# Patient Record
Sex: MALE | Race: OTHER | HISPANIC OR LATINO | Employment: PART TIME | URBAN - METROPOLITAN AREA
[De-identification: names, ages, dates, MRNs, and addresses within clinical notes are randomized per-mention and may not be internally consistent; named-entity substitution may affect disease eponyms.]

---

## 2019-08-03 ENCOUNTER — APPOINTMENT (EMERGENCY)
Dept: RADIOLOGY | Facility: HOSPITAL | Age: 39
End: 2019-08-03
Payer: COMMERCIAL

## 2019-08-03 ENCOUNTER — HOSPITAL ENCOUNTER (EMERGENCY)
Facility: HOSPITAL | Age: 39
Discharge: HOME | End: 2019-08-03
Attending: EMERGENCY MEDICINE
Payer: COMMERCIAL

## 2019-08-03 VITALS
SYSTOLIC BLOOD PRESSURE: 113 MMHG | OXYGEN SATURATION: 90 % | HEART RATE: 106 BPM | DIASTOLIC BLOOD PRESSURE: 68 MMHG | TEMPERATURE: 96.8 F | RESPIRATION RATE: 16 BRPM

## 2019-08-03 DIAGNOSIS — T40.1X1A HEROIN OVERDOSE (HCC): ICD-10-CM

## 2019-08-03 DIAGNOSIS — J18.9 PNEUMONIA: Primary | ICD-10-CM

## 2019-08-03 PROCEDURE — 71045 X-RAY EXAM CHEST 1 VIEW: CPT

## 2019-08-03 PROCEDURE — 99285 EMERGENCY DEPT VISIT HI MDM: CPT

## 2019-08-03 PROCEDURE — 99283 EMERGENCY DEPT VISIT LOW MDM: CPT | Performed by: EMERGENCY MEDICINE

## 2019-08-03 RX ORDER — DOXYCYCLINE HYCLATE 100 MG/1
100 CAPSULE ORAL 2 TIMES DAILY
Qty: 14 CAPSULE | Refills: 0 | Status: SHIPPED | OUTPATIENT
Start: 2019-08-03 | End: 2019-08-10

## 2019-08-03 RX ORDER — NALOXONE HYDROCHLORIDE 1 MG/ML
INJECTION INTRAMUSCULAR; INTRAVENOUS; SUBCUTANEOUS
Status: COMPLETED
Start: 2019-08-03 | End: 2019-08-03

## 2020-03-03 ENCOUNTER — HOSPITAL ENCOUNTER (EMERGENCY)
Facility: HOSPITAL | Age: 40
Discharge: HOME | End: 2020-03-03
Attending: EMERGENCY MEDICINE | Admitting: EMERGENCY MEDICINE
Payer: COMMERCIAL

## 2020-03-03 VITALS
RESPIRATION RATE: 18 BRPM | TEMPERATURE: 98.9 F | SYSTOLIC BLOOD PRESSURE: 139 MMHG | BODY MASS INDEX: 35.66 KG/M2 | HEART RATE: 92 BPM | OXYGEN SATURATION: 100 % | DIASTOLIC BLOOD PRESSURE: 65 MMHG | WEIGHT: 277.78 LBS

## 2020-03-03 DIAGNOSIS — M54.50 LOW BACK PAIN: Primary | ICD-10-CM

## 2020-03-03 PROCEDURE — 96372 THER/PROPH/DIAG INJ SC/IM: CPT

## 2020-03-03 PROCEDURE — 99284 EMERGENCY DEPT VISIT MOD MDM: CPT | Performed by: PHYSICIAN ASSISTANT

## 2020-03-03 PROCEDURE — 99283 EMERGENCY DEPT VISIT LOW MDM: CPT

## 2020-03-03 RX ORDER — NAPROXEN 500 MG/1
500 TABLET ORAL 2 TIMES DAILY PRN
Qty: 30 TABLET | Refills: 0 | Status: SHIPPED | OUTPATIENT
Start: 2020-03-03 | End: 2021-02-13

## 2020-03-03 RX ORDER — LIDOCAINE 50 MG/G
1 PATCH TOPICAL ONCE
Status: DISCONTINUED | OUTPATIENT
Start: 2020-03-03 | End: 2020-03-03 | Stop reason: HOSPADM

## 2020-03-03 RX ORDER — DIAZEPAM 5 MG/1
5 TABLET ORAL ONCE
Status: COMPLETED | OUTPATIENT
Start: 2020-03-03 | End: 2020-03-03

## 2020-03-03 RX ORDER — DIAZEPAM 5 MG/1
5 TABLET ORAL 2 TIMES DAILY PRN
Qty: 10 TABLET | Refills: 0 | Status: SHIPPED | OUTPATIENT
Start: 2020-03-03 | End: 2021-02-13

## 2020-03-03 RX ORDER — KETOROLAC TROMETHAMINE 30 MG/ML
15 INJECTION, SOLUTION INTRAMUSCULAR; INTRAVENOUS ONCE
Status: COMPLETED | OUTPATIENT
Start: 2020-03-03 | End: 2020-03-03

## 2020-03-03 RX ADMIN — DIAZEPAM 5 MG: 5 TABLET ORAL at 20:03

## 2020-03-03 RX ADMIN — KETOROLAC TROMETHAMINE 15 MG: 30 INJECTION, SOLUTION INTRAMUSCULAR at 20:04

## 2020-03-03 RX ADMIN — LIDOCAINE 1 PATCH: 50 PATCH TOPICAL at 20:05

## 2020-03-04 NOTE — DISCHARGE INSTRUCTIONS
Please refer to the attached information for strict return instructions  If symptoms worsen or new symptoms develop please return to the ER   Please follow up with spine program

## 2020-03-04 NOTE — ED PROVIDER NOTES
History  Chief Complaint   Patient presents with    Back Pain     Patient reports sudden lower back pain when stepping out of his truck today  Reports injury to back years ago  Shama Fnug is a 45 yo M presenting with R sided low back pain which began after he "stepped off" his truck step this morning  States pain occasionally radiates down posterior R upper leg  Denies numbness, tingling, or weakness in lower extremities  No loss of control of bowel or bladder function  No saddle anesthesia  No fevers/chills/IVDA  Denies dysuria, urinary urgency, frequency, gross hematuria  Does describe prior history of similar back pain  History provided by:  Patient   used: No    Back Pain   Location:  Lumbar spine  Quality:  Aching and cramping  Radiates to:  R posterior upper leg  Duration:  12 hours  Timing:  Constant  Progression:  Waxing and waning  Chronicity:  New  Context: not falling and not lifting heavy objects    Relieved by:  None tried  Worsened by:  Touching, twisting and bending  Ineffective treatments:  None tried  Associated symptoms: leg pain    Associated symptoms: no abdominal pain, no bladder incontinence, no bowel incontinence, no chest pain, no dysuria, no fever, no headaches, no numbness, no paresthesias, no perianal numbness and no weakness    Risk factors: no recent surgery and no steroid use        None       Past Medical History:   Diagnosis Date    Asthma        Past Surgical History:   Procedure Laterality Date    NO PAST SURGERIES         History reviewed  No pertinent family history  I have reviewed and agree with the history as documented      E-Cigarette/Vaping    E-Cigarette Use Never User      E-Cigarette/Vaping Substances     Social History     Tobacco Use    Smoking status: Current Every Day Smoker     Types: Cigarettes    Smokeless tobacco: Never Used   Substance Use Topics    Alcohol use: Not Currently    Drug use: Not on file       Review of Systems Constitutional: Negative for activity change, chills and fever  HENT: Negative for congestion, rhinorrhea and sore throat  Eyes: Negative for photophobia and visual disturbance  Respiratory: Negative for cough, chest tightness, shortness of breath and wheezing  Cardiovascular: Negative for chest pain and palpitations  Gastrointestinal: Negative for abdominal pain, bowel incontinence, constipation, diarrhea, nausea and vomiting  Genitourinary: Negative for bladder incontinence, difficulty urinating, dysuria, enuresis, flank pain, frequency, hematuria and urgency  Musculoskeletal: Positive for back pain  Negative for gait problem, neck pain and neck stiffness  Skin: Negative for rash and wound  Neurological: Negative for dizziness, tremors, speech difficulty, weakness, light-headedness, numbness, headaches and paresthesias  No loss of bowel/bladder control       Physical Exam  Physical Exam   Constitutional: He is oriented to person, place, and time  He appears well-developed and well-nourished  No distress  HENT:   Head: Normocephalic and atraumatic  Right Ear: External ear normal    Left Ear: External ear normal    Nose: Nose normal    Mouth/Throat: Oropharynx is clear and moist  No oropharyngeal exudate  Eyes: Pupils are equal, round, and reactive to light  Conjunctivae and EOM are normal    Neck: Normal range of motion  Neck supple  Cardiovascular: Normal rate, regular rhythm, normal heart sounds and intact distal pulses  Exam reveals no gallop and no friction rub  No murmur heard  Pulmonary/Chest: Effort normal and breath sounds normal  No respiratory distress  He has no wheezes  He has no rales  Abdominal: Soft  He exhibits no distension and no mass  There is no tenderness  There is no guarding  Musculoskeletal: He exhibits tenderness  He exhibits no edema or deformity  TTP to R lumbar paraspinal musculature  Limited ROM low back in flexion/extension due to pain   5/5 strength to b/l LE  Intact sensation b/l LE  Ambulatory without significant difficulty  Lymphadenopathy:     He has no cervical adenopathy  Neurological: He is alert and oriented to person, place, and time  He displays normal reflexes  No cranial nerve deficit or sensory deficit  He exhibits normal muscle tone  Coordination normal    Skin: Skin is warm and dry  Capillary refill takes less than 2 seconds  No rash noted  He is not diaphoretic  No erythema  No pallor  Psychiatric: He has a normal mood and affect  His behavior is normal  Judgment and thought content normal    Nursing note and vitals reviewed  Vital Signs  ED Triage Vitals [03/03/20 1839]   Temperature Pulse Respirations Blood Pressure SpO2   98 9 °F (37 2 °C) 92 18 139/65 100 %      Temp Source Heart Rate Source Patient Position - Orthostatic VS BP Location FiO2 (%)   Temporal Monitor Sitting Right arm --      Pain Score       Worst Possible Pain           Vitals:    03/03/20 1839   BP: 139/65   Pulse: 92   Patient Position - Orthostatic VS: Sitting         Visual Acuity      ED Medications  Medications   ketorolac (TORADOL) injection 15 mg (15 mg Intramuscular Given 3/3/20 2004)   diazepam (VALIUM) tablet 5 mg (5 mg Oral Given 3/3/20 2003)       Diagnostic Studies  Results Reviewed     None                 No orders to display              Procedures  Procedures         ED Course                               MDM  Number of Diagnoses or Management Options  Low back pain:   Diagnosis management comments: R low back pain after stepping off truck this morning  Radiation down posterior R upper leg  Prior history of similar  No urinary symptoms  No lower extremity neuro symptoms  LE neuro intact  Will provide valium, toradol  Follow up with spine program advised  Strict return indications discussed       Patient Progress  Patient progress: stable        Disposition  Final diagnoses:   Low back pain     Time reflects when diagnosis was documented in both MDM as applicable and the Disposition within this note     Time User Action Codes Description Comment    3/3/2020  8:48 PM Haven Height Add [M54 5] Low back pain       ED Disposition     ED Disposition Condition Date/Time Comment    Discharge Stable Tue Mar 3, 2020  8:47 PM Anabell Yanez discharge to home/self care  Follow-up Information     Follow up With Specialties Details Why Contact Info Additional Information    [facility] Physical Therapy Schedule an appointment as soon as possible for a visit   [phone]  Emergency Department Emergency Medicine  If symptoms worsen [facility] [id number]  [place] [address] ED, [address] [facility] , [place], [place], [address]          Discharge Medication List as of 3/3/2020  8:51 PM      START taking these medications    Details   diazepam (VALIUM) 5 mg tablet Take 1 tablet (5 mg total) by mouth 2 (two) times a day as needed for muscle spasms for up to 10 doses, Starting Tue 3/3/2020, Normal      naproxen (NAPROSYN) 500 mg tablet Take 1 tablet (500 mg total) by mouth 2 (two) times a day as needed for mild pain or moderate pain, Starting Tue 3/3/2020, Normal           No discharge procedures on file      PDMP Review     None          ED Provider  Electronically Signed by           Mary Beth Martinez PA-C  03/04/20 0675

## 2020-03-06 ENCOUNTER — NURSE TRIAGE (OUTPATIENT)
Dept: PHYSICAL THERAPY | Facility: OTHER | Age: 40
End: 2020-03-06

## 2020-03-06 DIAGNOSIS — M54.50 ACUTE RIGHT-SIDED LOW BACK PAIN, UNSPECIFIED WHETHER SCIATICA PRESENT: Primary | ICD-10-CM

## 2020-03-06 NOTE — TELEPHONE ENCOUNTER
Additional Information   Negative: Is this related to a work injury?  Negative: Is this related to an MVA?  Negative: Are you currently recieving homecare services?  Negative: Has the patient had unexplained weight loss?  Negative: Does the patient have a fever?  Negative: Is the patient experiencing urine retention?  Negative: Is the patient experiencing blood in sputum?  Negative: Has the patient experienced major trauma? (fall from height, high speed collision, direct blow to spine) and is also experiencing nausea, light-headedness, or loss of consciousness?  Negative: Is the patient experiencing acute drop foot or paralysis?  Negative: Is this a chronic condition? Background - Initial Assessment  Clinical complaint: right sided mid to low back pain  Radiates down to right  Leg and foot  Date of onset: 3/3/20 when getting out of truck-felt sudden pain in back   Frequency of pain: constant  Quality of pain: stabbing "shock" like    Protocols used: [facility] COMPREHENSIVE SPINE PROGRAM PROTOCOL    Please ED Note from 3/3/20    Patient went to ED 3/3/20 for above complaints  Symptoms have not resolved and his pain is radiating down to right foot on occasion  "it feels like shocks or spasms"  Able to ambulate and is on light duty at work as a   Patient currently at work, but willing to do triage with nurse at this time  Referral entered and sent to preferred [facility] site  Patient could not take contact information down at end of call  Nurse instructed him to look up on line and to reach out to that facility if he does not hear from them by Monday morning  Agreed and very appreciative of call/triage  Patient made aware that he will be receiving a call from the behavioral office as well d/t score  Agreed and understood      Self referral

## 2020-03-09 ENCOUNTER — TRANSCRIBE ORDERS (OUTPATIENT)
Dept: PHYSICAL THERAPY | Facility: CLINIC | Age: 40
End: 2020-03-09

## 2020-03-09 ENCOUNTER — EVALUATION (OUTPATIENT)
Dept: PHYSICAL THERAPY | Facility: CLINIC | Age: 40
End: 2020-03-09
Payer: COMMERCIAL

## 2020-03-09 VITALS — SYSTOLIC BLOOD PRESSURE: 132 MMHG | TEMPERATURE: 98.6 F | HEART RATE: 71 BPM | DIASTOLIC BLOOD PRESSURE: 94 MMHG

## 2020-03-09 DIAGNOSIS — M54.50 ACUTE RIGHT-SIDED LOW BACK PAIN, UNSPECIFIED WHETHER SCIATICA PRESENT: Primary | ICD-10-CM

## 2020-03-09 PROCEDURE — 97161 PT EVAL LOW COMPLEX 20 MIN: CPT | Performed by: PHYSICAL THERAPIST
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
## 2020-03-09 NOTE — LETTER
March 9, 2020      Patient: Lanie Barcenas  YOB: 1980   Date of Visit: 3/9/2020      To Whom it May Concern:    Lanie Barcenas has been seen by the Shaun Segovia and is now under my care  Lanie Barcenas may return to work on 3/9/2020 with no heavy lifting (>20 pounds)  Please contact me if you should have any questions       Sincerely,    Ting Haney, PT

## 2020-03-09 NOTE — LETTER
March 9, 2020      Patient: Gonsalo Church  YOB: 1980   Date of Visit: 3/9/2020      To Whom it May Concern:    Gonsalo Church has been seen by the Shaun Segovia and is now under my care  Gonsalo Church may return to work on 3/9/2020 with no heavy lifting (>20 pounds)  Please contact me if you should have any questions       Sincerely,    Delma Zapata PT

## 2020-03-12 ENCOUNTER — OFFICE VISIT (OUTPATIENT)
Dept: PHYSICAL THERAPY | Facility: CLINIC | Age: 40
End: 2020-03-12
Payer: COMMERCIAL

## 2020-03-12 DIAGNOSIS — M54.50 ACUTE RIGHT-SIDED LOW BACK PAIN, UNSPECIFIED WHETHER SCIATICA PRESENT: Primary | ICD-10-CM

## 2020-03-12 PROCEDURE — 97112 NEUROMUSCULAR REEDUCATION: CPT

## 2020-03-12 PROCEDURE — 97110 THERAPEUTIC EXERCISES: CPT

## 2020-03-12 NOTE — PROGRESS NOTES
Patient insistent on returning to work tomorrow, as he states he is in financial dire straits  Patient states "If you don't give me a note, I'm going to be homeless " He was insistent and pressured PTA and PT regarding the issue, stating "I am not leaving here without a note " He reports he will be returning to light duty  Note was written for patient today to return to work, however, I advised him that he should avoid heavy lifting to avoid reaggravating symptoms  Patient state he will still be returning for follow up therapy appointments

## 2020-03-12 NOTE — PROGRESS NOTES
Daily Note     Today's date: 3/12/2020  Patient name: Lanie Barcenas  : 1980  MRN: [id number]  Referring provider: Bruno Moreira, PT  Dx:   Encounter Diagnosis     ICD-10-CM    1  Acute right-sided low back pain, unspecified whether sciatica present M54 5                   Subjective: Pt states he feels much better since his last session  Pt denied having any pain at this time  Pt states he would like to get another note for work stating that he has no limitation as he needs to get back to work  Objective: See treatment diary below      Assessment:  Supervising PT was notified of pt request about his note for work  Supervising PT addressed this with the pts  Pt tolerated treatment as expected with no reported pain throughout session  Pt required VC throughout session for proper TA contraction  Patient would benefit from continued PT      Plan: Continue per plan of care  Precautions: n/a    Date 3/9            FOTO IE            Re-eval IE              Daily Treatment Diary     Manual  3/9 3/12           Lumbar sidelying rotational mobs KELLY Gr IV bilat           Lumbar LF/Rot  mobs                            Exercise Diary  3/9 3/12          bike  5 min           Lumbar pball roll outs  5" x 10           Seated hamstring str  3 x 30"           Seated QL stretch  30" x 3                       LTR 30"x3 e 30" x 3 ea          SKTC 30"x3 e 30" x 3 ea           90/90 nn glides 30x3 e 30 x 3 ea           Sidelying UTR  5" x 10           H/L TA   5" x 10                       3-way Child's pose                        Progress to:            SLR w/ TA            H/L TA alt UE/LE            Quad   Alt UE/LE                  Modalities  3/9

## 2020-03-17 ENCOUNTER — APPOINTMENT (OUTPATIENT)
Dept: PHYSICAL THERAPY | Facility: CLINIC | Age: 40
End: 2020-03-17
Payer: COMMERCIAL

## 2020-03-26 ENCOUNTER — APPOINTMENT (OUTPATIENT)
Dept: PHYSICAL THERAPY | Facility: CLINIC | Age: 40
End: 2020-03-26
Payer: COMMERCIAL

## 2020-03-31 ENCOUNTER — APPOINTMENT (OUTPATIENT)
Dept: PHYSICAL THERAPY | Facility: CLINIC | Age: 40
End: 2020-03-31
Payer: COMMERCIAL

## 2020-06-14 ENCOUNTER — HOSPITAL ENCOUNTER (EMERGENCY)
Facility: HOSPITAL | Age: 40
Discharge: HOME | End: 2020-06-14
Attending: EMERGENCY MEDICINE | Admitting: EMERGENCY MEDICINE
Payer: COMMERCIAL

## 2020-06-14 VITALS
HEIGHT: 74 IN | BODY MASS INDEX: 34.65 KG/M2 | TEMPERATURE: 98.4 F | WEIGHT: 270 LBS | RESPIRATION RATE: 16 BRPM | HEART RATE: 71 BPM | SYSTOLIC BLOOD PRESSURE: 122 MMHG | OXYGEN SATURATION: 98 % | DIASTOLIC BLOOD PRESSURE: 67 MMHG

## 2020-06-14 DIAGNOSIS — G89.29 ACUTE EXACERBATION OF CHRONIC LOW BACK PAIN: Primary | ICD-10-CM

## 2020-06-14 DIAGNOSIS — M54.50 ACUTE EXACERBATION OF CHRONIC LOW BACK PAIN: Primary | ICD-10-CM

## 2020-06-14 PROCEDURE — 99283 EMERGENCY DEPT VISIT LOW MDM: CPT

## 2020-06-14 PROCEDURE — 96372 THER/PROPH/DIAG INJ SC/IM: CPT

## 2020-06-14 PROCEDURE — 99284 EMERGENCY DEPT VISIT MOD MDM: CPT | Performed by: PHYSICIAN ASSISTANT

## 2020-06-14 RX ORDER — LIDOCAINE 50 MG/G
1 PATCH TOPICAL ONCE
Status: DISCONTINUED | OUTPATIENT
Start: 2020-06-14 | End: 2020-06-14 | Stop reason: HOSPADM

## 2020-06-14 RX ORDER — NAPROXEN 500 MG/1
500 TABLET ORAL 2 TIMES DAILY WITH MEALS
Qty: 12 TABLET | Refills: 0 | Status: SHIPPED | OUTPATIENT
Start: 2020-06-14 | End: 2021-02-13

## 2020-06-14 RX ORDER — CYCLOBENZAPRINE HCL 10 MG
10 TABLET ORAL 2 TIMES DAILY PRN
Qty: 14 TABLET | Refills: 0 | Status: SHIPPED | OUTPATIENT
Start: 2020-06-14 | End: 2021-02-13

## 2020-06-14 RX ORDER — CYCLOBENZAPRINE HCL 10 MG
10 TABLET ORAL ONCE
Status: COMPLETED | OUTPATIENT
Start: 2020-06-14 | End: 2020-06-14

## 2020-06-14 RX ORDER — KETOROLAC TROMETHAMINE 30 MG/ML
15 INJECTION, SOLUTION INTRAMUSCULAR; INTRAVENOUS ONCE
Status: COMPLETED | OUTPATIENT
Start: 2020-06-14 | End: 2020-06-14

## 2020-06-14 RX ADMIN — CYCLOBENZAPRINE HYDROCHLORIDE 10 MG: 10 TABLET, FILM COATED ORAL at 11:27

## 2020-06-14 RX ADMIN — KETOROLAC TROMETHAMINE 15 MG: 30 INJECTION, SOLUTION INTRAMUSCULAR at 11:26

## 2020-06-14 RX ADMIN — LIDOCAINE 1 PATCH: 50 PATCH TOPICAL at 11:27

## 2020-06-15 ENCOUNTER — NURSE TRIAGE (OUTPATIENT)
Dept: PHYSICAL THERAPY | Facility: OTHER | Age: 40
End: 2020-06-15

## 2020-06-15 DIAGNOSIS — G89.29 ACUTE EXACERBATION OF CHRONIC LOW BACK PAIN: Primary | ICD-10-CM

## 2020-06-15 DIAGNOSIS — M54.50 ACUTE EXACERBATION OF CHRONIC LOW BACK PAIN: Primary | ICD-10-CM

## 2020-06-16 ENCOUNTER — AMB VIDEO VISIT (OUTPATIENT)
Dept: URGENT CARE | Facility: CLINIC | Age: 40
End: 2020-06-16

## 2020-06-16 ENCOUNTER — EVALUATION (OUTPATIENT)
Dept: PHYSICAL THERAPY | Facility: CLINIC | Age: 40
End: 2020-06-16
Payer: COMMERCIAL

## 2020-06-16 DIAGNOSIS — G89.29 ACUTE EXACERBATION OF CHRONIC LOW BACK PAIN: ICD-10-CM

## 2020-06-16 DIAGNOSIS — M62.830 SPASM OF MUSCLE OF LOWER BACK: Primary | ICD-10-CM

## 2020-06-16 DIAGNOSIS — M54.50 ACUTE EXACERBATION OF CHRONIC LOW BACK PAIN: ICD-10-CM

## 2020-06-16 PROCEDURE — 97162 PT EVAL MOD COMPLEX 30 MIN: CPT | Performed by: PHYSICAL THERAPIST

## 2020-06-16 RX ORDER — PREDNISONE 50 MG/1
50 TABLET ORAL DAILY
Qty: 5 TABLET | Refills: 0 | Status: SHIPPED | OUTPATIENT
Start: 2020-06-16 | End: 2020-06-21

## 2020-06-16 RX ORDER — METHOCARBAMOL 750 MG/1
750 TABLET, FILM COATED ORAL EVERY 6 HOURS PRN
Qty: 12 TABLET | Refills: 0 | Status: SHIPPED | OUTPATIENT
Start: 2020-06-16 | End: 2021-02-13

## 2020-06-18 ENCOUNTER — OFFICE VISIT (OUTPATIENT)
Dept: PHYSICAL THERAPY | Facility: CLINIC | Age: 40
End: 2020-06-18
Payer: COMMERCIAL

## 2020-06-18 DIAGNOSIS — M54.50 ACUTE EXACERBATION OF CHRONIC LOW BACK PAIN: Primary | ICD-10-CM

## 2020-06-18 DIAGNOSIS — G89.29 ACUTE EXACERBATION OF CHRONIC LOW BACK PAIN: Primary | ICD-10-CM

## 2020-06-18 PROCEDURE — 97110 THERAPEUTIC EXERCISES: CPT | Performed by: PHYSICAL THERAPIST

## 2020-06-18 PROCEDURE — 97014 ELECTRIC STIMULATION THERAPY: CPT | Performed by: PHYSICAL THERAPIST

## 2020-06-18 PROCEDURE — 97010 HOT OR COLD PACKS THERAPY: CPT | Performed by: PHYSICAL THERAPIST

## 2020-06-18 PROCEDURE — 97530 THERAPEUTIC ACTIVITIES: CPT | Performed by: PHYSICAL THERAPIST

## 2020-06-18 PROCEDURE — 97112 NEUROMUSCULAR REEDUCATION: CPT | Performed by: PHYSICAL THERAPIST

## 2020-06-23 ENCOUNTER — OFFICE VISIT (OUTPATIENT)
Dept: PHYSICAL THERAPY | Facility: CLINIC | Age: 40
End: 2020-06-23
Payer: COMMERCIAL

## 2020-06-23 DIAGNOSIS — G89.29 ACUTE EXACERBATION OF CHRONIC LOW BACK PAIN: Primary | ICD-10-CM

## 2020-06-23 DIAGNOSIS — M54.50 ACUTE EXACERBATION OF CHRONIC LOW BACK PAIN: Primary | ICD-10-CM

## 2020-06-23 PROCEDURE — 97014 ELECTRIC STIMULATION THERAPY: CPT | Performed by: PHYSICAL THERAPIST

## 2020-06-23 PROCEDURE — 97010 HOT OR COLD PACKS THERAPY: CPT | Performed by: PHYSICAL THERAPIST

## 2020-06-23 PROCEDURE — 97112 NEUROMUSCULAR REEDUCATION: CPT | Performed by: PHYSICAL THERAPIST

## 2020-06-23 PROCEDURE — 97110 THERAPEUTIC EXERCISES: CPT | Performed by: PHYSICAL THERAPIST

## 2020-06-24 ENCOUNTER — TRANSCRIBE ORDERS (OUTPATIENT)
Dept: ADMINISTRATIVE | Facility: HOSPITAL | Age: 40
End: 2020-06-24

## 2020-06-24 ENCOUNTER — CONSULT (OUTPATIENT)
Dept: NEUROSURGERY | Facility: CLINIC | Age: 40
End: 2020-06-24
Payer: COMMERCIAL

## 2020-06-24 ENCOUNTER — HOSPITAL ENCOUNTER (OUTPATIENT)
Dept: RADIOLOGY | Facility: HOSPITAL | Age: 40
Discharge: HOME | End: 2020-06-24

## 2020-06-24 VITALS
SYSTOLIC BLOOD PRESSURE: 129 MMHG | DIASTOLIC BLOOD PRESSURE: 84 MMHG | WEIGHT: 283 LBS | BODY MASS INDEX: 36.32 KG/M2 | RESPIRATION RATE: 16 BRPM | TEMPERATURE: 98 F | HEART RATE: 82 BPM | HEIGHT: 74 IN

## 2020-06-24 DIAGNOSIS — M54.50 ACUTE EXACERBATION OF CHRONIC LOW BACK PAIN: ICD-10-CM

## 2020-06-24 DIAGNOSIS — N50.819 TESTICULAR PAIN: Primary | ICD-10-CM

## 2020-06-24 DIAGNOSIS — G89.29 ACUTE EXACERBATION OF CHRONIC LOW BACK PAIN: ICD-10-CM

## 2020-06-24 DIAGNOSIS — N50.819 TESTICULAR PAIN: ICD-10-CM

## 2020-06-24 PROCEDURE — 99204 OFFICE O/P NEW MOD 45 MIN: CPT | Performed by: PHYSICIAN ASSISTANT

## 2020-06-25 ENCOUNTER — OFFICE VISIT (OUTPATIENT)
Dept: PHYSICAL THERAPY | Facility: CLINIC | Age: 40
End: 2020-06-25
Payer: COMMERCIAL

## 2020-06-25 DIAGNOSIS — G89.29 ACUTE EXACERBATION OF CHRONIC LOW BACK PAIN: Primary | ICD-10-CM

## 2020-06-25 DIAGNOSIS — M54.50 ACUTE EXACERBATION OF CHRONIC LOW BACK PAIN: Primary | ICD-10-CM

## 2020-06-25 PROCEDURE — 97140 MANUAL THERAPY 1/> REGIONS: CPT | Performed by: PHYSICAL THERAPIST

## 2020-06-25 PROCEDURE — 97110 THERAPEUTIC EXERCISES: CPT | Performed by: PHYSICAL THERAPIST

## 2020-06-25 PROCEDURE — 97014 ELECTRIC STIMULATION THERAPY: CPT | Performed by: PHYSICAL THERAPIST

## 2020-06-25 PROCEDURE — 97530 THERAPEUTIC ACTIVITIES: CPT | Performed by: PHYSICAL THERAPIST

## 2020-06-25 PROCEDURE — 97010 HOT OR COLD PACKS THERAPY: CPT | Performed by: PHYSICAL THERAPIST

## 2020-07-01 ENCOUNTER — TELEPHONE (OUTPATIENT)
Dept: PSYCHIATRY | Facility: CLINIC | Age: 40
End: 2020-07-01

## 2020-07-01 NOTE — TELEPHONE ENCOUNTER
Behavorial Health Outpatient Intake Questions    Referred by: [facility]     Please advised interviewee that they need to answer all questions truthfully to allow for best care and any misrepresentations of information may affect their ability to be seen at this clinic   => Was this discussed? Yes     Behavorial Health Outpatient Intake History -     Presenting Problem (in patient's words): His current back pain is causing him some Anxiety and Depression, can't move around like he use to  Patient wants to try therapy  Are there any developmental disabilities? ? If yes, can they speak to you on the phone? If they are too limited to speak to you on phone, refer out No    Are you taking any psychiatric medications? No    => If yes, who prescribes? If yes, are they injectable medications? Does the patient have a language barrier or hearing impairment? No    Have you been treated at [facility] by a therapist or a doctor in the past? If yes, who? No    Has the patient been hospitalized for mental health? No   If yes, how long ago was last hospitalization and where was it? Do you actively use alcohol or marijuana or illegal substances? If yes, what and how much - refer out to Drug and alcohol treatment if use is excessive or daily use of illegal substances No concerns of substance abuse are reported  Do you have a community treatment team or ? No    Legal History-     Does the patient have any history of arrests, custodial/skilled nursing time, or DUIs? Yes  If Yes-  1) What types of charges? custodial time   2) When were they last incarcerated? Got out in 2018   3) Are they currently on parole or probation? Nope     Minor Child-    Who has custody of the child? N/A    Is there a custody agreement? N/A    If there is a custody agreement remind parent that they must bring a copy to the first appt or they will not be seen       Intake Team, please check with provider before scheduling if flags come up such as:  - complex case  - legal history (other than DUI)  - communication barrier concerns are present  - if, in your judgment, this needs further review    ACCEPTED as a patient Yes  => Appointment Date: WednesdayJagdeep 26th at 1:00PM with Kemi Thorne    Referred Elsewhere? No    Name of Insurance Adrian Bliss MA [facility]  Insurance ID# [id number]  Insurance Phone #  If ins is primary or secondary  If patient is a minor, parents information such as Name, D  O B of guarantor

## 2020-07-02 ENCOUNTER — OFFICE VISIT (OUTPATIENT)
Dept: PHYSICAL THERAPY | Facility: CLINIC | Age: 40
End: 2020-07-02
Payer: COMMERCIAL

## 2020-07-06 ENCOUNTER — OFFICE VISIT (OUTPATIENT)
Dept: PHYSICAL THERAPY | Facility: CLINIC | Age: 40
End: 2020-07-06
Payer: COMMERCIAL

## 2020-07-06 DIAGNOSIS — G89.29 ACUTE EXACERBATION OF CHRONIC LOW BACK PAIN: Primary | ICD-10-CM

## 2020-07-06 DIAGNOSIS — M54.50 ACUTE EXACERBATION OF CHRONIC LOW BACK PAIN: Primary | ICD-10-CM

## 2020-07-06 PROCEDURE — 97140 MANUAL THERAPY 1/> REGIONS: CPT | Performed by: PHYSICAL THERAPIST

## 2020-07-06 PROCEDURE — 97110 THERAPEUTIC EXERCISES: CPT | Performed by: PHYSICAL THERAPIST

## 2020-07-06 PROCEDURE — 97112 NEUROMUSCULAR REEDUCATION: CPT | Performed by: PHYSICAL THERAPIST

## 2020-07-06 NOTE — PROGRESS NOTES
Daily Note     Today's date: 2020  Patient name: Len Mena  : 1980  MRN: [id number]  Referring provider: Aminta Manriquez, PT  Dx:   Encounter Diagnosis     ICD-10-CM    1  Acute exacerbation of chronic low back pain M54 5     G89 29                   Subjective: Patient reports that he no longer has the testicle and groin pain  He reports that he is still having pain, in the right side of his low back, but it is much better  He reports that he has been getting "beautician massages" and this has been really helpful  Objective: See treatment diary below      Assessment: Tolerated treatment well  Patient exhibited good technique with therapeutic exercises  Patient was able to perform all exercises noted today without increased pain  He is improving slowly long term goals  Plan: Continue per plan of care  Diagnosis: Acute exacerbation of low back pain   Precautions: unable to tolerate lateral shifting   Goals: improve lateral shift first   Manual Therapy 20   STM QL NV  NV NV HJS, PT HJS, PT                                   Exercise Diary         Therapeutic Exercise        Lateral shift (right shoulder on wall) for assessment only 10x- patient unable to tolerate  Attempt again NV  3x10 with breathing           NP today 5x, shift resolved 2x10   Prone lying   5 mins 10 mins 10 mins   MARKELL   5 mins 10 mins 10 mins   PPU   20x - seemed to increase pain when standing  unable 10x                            Neuromuscular Re-education        Prone PFMC  [id number] 10x10 sec [id number] Ladders: 10x   Prone TrA act  78v71xuk 10x10 sec 73e91rqv Ladders: 10x   Prone Glut set  04x95yex  10x10 sec 35f99oog Ladders: 10x    Prone alt glut set     [id number]                                   Therapeutic Activities        Pt education    Lateral shift HEP vs extension  Keeping up with exercises   HEP           Modalities        Sidelying MHP & IFC to start treatment 10 min 10 mins at the end of treatment 15 mins at the end of treatment Pt defer   Prone CP at the end of treatment  10 mins

## 2020-07-09 ENCOUNTER — APPOINTMENT (OUTPATIENT)
Dept: PHYSICAL THERAPY | Facility: CLINIC | Age: 40
End: 2020-07-09
Payer: COMMERCIAL

## 2020-07-13 ENCOUNTER — OFFICE VISIT (OUTPATIENT)
Dept: PHYSICAL THERAPY | Facility: CLINIC | Age: 40
End: 2020-07-13
Payer: COMMERCIAL

## 2020-07-13 DIAGNOSIS — M54.50 ACUTE EXACERBATION OF CHRONIC LOW BACK PAIN: Primary | ICD-10-CM

## 2020-07-13 DIAGNOSIS — G89.29 ACUTE EXACERBATION OF CHRONIC LOW BACK PAIN: Primary | ICD-10-CM

## 2020-07-13 PROCEDURE — 97140 MANUAL THERAPY 1/> REGIONS: CPT | Performed by: PHYSICAL THERAPIST

## 2020-07-13 PROCEDURE — 97112 NEUROMUSCULAR REEDUCATION: CPT | Performed by: PHYSICAL THERAPIST

## 2020-07-13 PROCEDURE — 97110 THERAPEUTIC EXERCISES: CPT | Performed by: PHYSICAL THERAPIST

## 2020-07-13 NOTE — PROGRESS NOTES
PT Discharge    Today's date: 2020  Patient name: Mandy Mota  : 1980  MRN: [id number]  Referring provider: Mary Kate Soto PT  Dx:   Encounter Diagnosis     ICD-10-CM    1  Acute exacerbation of chronic low back pain M54 5     G89 29                   Assessment  Mandy Mota has been attending PT and has been working on home exercise program since initial evcarito Pimentel Naveed  has made improvements in objective data since initial evalulation and has achieved all goals  Patient reports having returned to their prior level of function  Patient provided with updated Home Exercise Program, all questions answered, and verbalized understanding, agreeing to plan of care  Thus it was mutually decided to discontinue this episode of care and transition to Home Exercise Program     Goals  Impairment Goals  - Decrease pain by 50% in 4 weeks  - Increase bilateral flexibility by 50% in 6 weeks  - Increase bilateral lower extremity strength golbally to 4/5 in 6 weeks  - Increase bilateral hip abductor and external rotator strength strength to 4+/5  8 weeks  Functional Goals  - Return to Prior Level of Function in 6 weeks  - Patient will be independent with HEP in 2 weeks      Plan  Patient would benefit from: skilled physical therapy  Planned modality interventions: cryotherapy, thermotherapy: hydrocollator packs and TENS  Planned therapy interventions: home exercise program, graded exercise, functional ROM exercises, flexibility, body mechanics training, postural training, patient education, therapeutic activities, therapeutic exercise, manual therapy, joint mobilization and neuromuscular re-education  Frequency: 2x week  Duration in weeks: 4  Treatment plan discussed with: patient and PCP        Subjective Evaluation    Pain  Current pain ratin  At best pain ratin  At worst pain ratin  Location: low back pain from low TS to LS    Patient Goals  Patient goal: to be able to return to work  -MET      PAIN LOCATION/DESCRIPTORS:  Patient does not have pain now  AGGRAVATING FACTORS:     No longer having pain with: sitting up straight, standing, no longer has to bend to walk or lean to one side to walk  His wife does not have to roll him over in bed  He can sleep however he wants now  He can put his socks and shoes on now  Patient was able to do his own yard this weekend without problems  He thinks that he is ready to return to work  Objective     Concurrent Complaints  Negative for night pain, able to sleep, bladder dysfunction, bowel dysfunction, saddle (S4) numbness, cardiac problem, kidney problem, gallbladder problem, stomach problem, ulcer, appendix problem, spleen problem, pancreas problem, history of cancer, history of trauma and infection    Postural Observations  Seated posture: good  Standing posture: good  Correction of posture: better    Additional Postural Observation Details  Able to sit up functionally  Patient has no shift present  Neurological Testing     Reflexes   Left   Patellar (L4): absent (0)  Achilles (S1): absent (0)    Right   Patellar (L4): absent (0)  Achilles (S1): absent (0)    Additional Neurological Details  Neuro Screen of the Lower Quarter:     Dermatomes: equal and symmetrical bilaterally throughout  Myotomes: equal and symmetrical bilaterally throughout (unable to test hip flexion or knee extension)           Active Range of Motion     Additional Active Range of Motion Details  100% motion throughout  Painfree  Mechanical Assessment    Cervical      Thoracic      Lumbar        Functional Assessment      Squat    100% no deviations     General Comments:      Lumbar Comments  Patient returned to 100%  He was able to perform all lifting techniques today  Lifting 5# from floor to waist with proper mechanics    He was also able to lift 25# from chair to waist               Diagnosis: Acute exacerbation of low back pain   Precautions: unable to tolerate lateral shifting   Goals: improve lateral shift first   Manual Therapy 7/13/20 6/23/20 6/25/20 7/6/20   STM QL NV   NV HJS, PT HJS, PT   RE HJS, PT                               Exercise Diary         Therapeutic Exercise        Lateral shift (right shoulder on wall) for assessment only   NP today 5x, shift resolved 2x10   Prone lying 5 mins  5 mins 10 mins 10 mins   MARKELL   5 mins 10 mins 10 mins   PPU   20x - seemed to increase pain when standing  unable 10x    Recovery of function Performed  Neuromuscular Re-education        Prone PFMC 10x  10x10 sec [id number] Ladders: 10x   Prone TrA act 10x  10x10 sec 04x23qtc Ladders: 10x   Prone Glut set 10x  10x10 sec 88j18yky Ladders: 10x    Prone alt glut set     [id number]   Multifidus Activation 11e37ale                               Therapeutic Activities        Pt education HEP   POC  Lifting mechanics  Activating multidus   Lateral shift HEP vs extension  Keeping up with exercises   HEP           Modalities        Sidelying MHP & IFC to start treatment   10 mins at the end of treatment 15 mins at the end of treatment Pt defer   Prone CP at the end of treatment

## 2020-08-26 PROBLEM — F32.A DEPRESSION: Status: ACTIVE | Noted: 2020-08-26

## 2020-08-26 PROBLEM — F41.9 ANXIETY: Status: ACTIVE | Noted: 2020-08-26

## 2021-02-13 ENCOUNTER — HOSPITAL ENCOUNTER (EMERGENCY)
Facility: HOSPITAL | Age: 41
Discharge: HOME | End: 2021-02-13
Attending: EMERGENCY MEDICINE | Admitting: EMERGENCY MEDICINE
Payer: COMMERCIAL

## 2021-02-13 ENCOUNTER — APPOINTMENT (EMERGENCY)
Dept: RADIOLOGY | Facility: HOSPITAL | Age: 41
End: 2021-02-13
Payer: COMMERCIAL

## 2021-02-13 VITALS
HEIGHT: 74 IN | OXYGEN SATURATION: 97 % | SYSTOLIC BLOOD PRESSURE: 128 MMHG | BODY MASS INDEX: 35.94 KG/M2 | HEART RATE: 68 BPM | RESPIRATION RATE: 17 BRPM | WEIGHT: 280 LBS | TEMPERATURE: 97.4 F | DIASTOLIC BLOOD PRESSURE: 82 MMHG

## 2021-02-13 DIAGNOSIS — M79.671 FOOT PAIN, RIGHT: Primary | ICD-10-CM

## 2021-02-13 DIAGNOSIS — M72.2 PLANTAR FASCIITIS: ICD-10-CM

## 2021-02-13 PROCEDURE — 99284 EMERGENCY DEPT VISIT MOD MDM: CPT | Performed by: EMERGENCY MEDICINE

## 2021-02-13 PROCEDURE — 73630 X-RAY EXAM OF FOOT: CPT

## 2021-02-13 PROCEDURE — 99283 EMERGENCY DEPT VISIT LOW MDM: CPT

## 2021-02-13 RX ORDER — NAPROXEN 500 MG/1
500 TABLET ORAL 2 TIMES DAILY WITH MEALS
Qty: 30 TABLET | Refills: 0 | Status: SHIPPED | OUTPATIENT
Start: 2021-02-13 | End: 2021-09-21

## 2021-02-13 NOTE — ED PROVIDER NOTES
History  Chief Complaint   Patient presents with    Foot Pain     pt c/o right foot pain "for a couple of months", previously seen by other physicians with no improvement  This is a 28-year-old male presenting for evaluation of right foot pain  He states it has been chronic for several months now, did see a podiatrist who did injections which helped very briefly  He was given a boot to help stretches foot that has not helped either  He states that he has to work a job that required him to wear steel-toed boots he now has hammertoes and chronic pain  History provided by:  Patient   used: No        None       Past Medical History:   Diagnosis Date    Asthma     Scoliosis        Past Surgical History:   Procedure Laterality Date    NO PAST SURGERIES         History reviewed  No pertinent family history  I have reviewed and agree with the history as documented  E-Cigarette/Vaping    E-Cigarette Use Never User      E-Cigarette/Vaping Substances     Social History     Tobacco Use    Smoking status: Current Every Day Smoker     Packs/day: 0 50     Types: Cigarettes    Smokeless tobacco: Never Used   Substance Use Topics    Alcohol use: Not Currently    Drug use: Yes     Types: Marijuana       Review of Systems   All other systems reviewed and are negative  Physical Exam  Physical Exam  Vitals signs and nursing note reviewed  Constitutional:       Appearance: Normal appearance  He is normal weight  HENT:      Head: Normocephalic and atraumatic  Nose: Nose normal    Eyes:      Conjunctiva/sclera: Conjunctivae normal    Cardiovascular:      Rate and Rhythm: Normal rate  Pulmonary:      Effort: Pulmonary effort is normal    Abdominal:      General: Abdomen is flat  Musculoskeletal:      Comments: Tenderness to right foot at base of 5th metatarsal, there is mild swelling and prominence  Digits 2, 3, 4 have hammertoe deformities     Skin:     General: Skin is warm and dry  Capillary Refill: Capillary refill takes less than 2 seconds  Neurological:      General: No focal deficit present  Mental Status: He is alert  Psychiatric:         Mood and Affect: Mood normal          Behavior: Behavior normal          Vital Signs  ED Triage Vitals [02/13/21 1008]   Temperature Pulse Respirations Blood Pressure SpO2   (!) 97 4 °F (36 3 °C) 68 17 128/82 97 %      Temp Source Heart Rate Source Patient Position - Orthostatic VS BP Location FiO2 (%)   Oral Monitor Sitting Left arm --      Pain Score       9           Vitals:    02/13/21 1008   BP: 128/82   Pulse: 68   Patient Position - Orthostatic VS: Sitting         Visual Acuity      ED Medications  Medications - No data to display    Diagnostic Studies  Results Reviewed     None                 XR foot 3+ views RIGHT   ED Interpretation by Isidoro Dai DO (02/13 1048)   No fracture/dislocation      Final Result by Miguel Angel Jensen MD (02/13 1456)      No acute osseous abnormality  Workstation performed: [id number]                    Procedures  Procedures         ED Course                             SBIRT 22yo+      Most Recent Value   SBIRT (24 yo +)   In order to provide better care to our patients, we are screening all of our patients for alcohol and drug use  Would it be okay to ask you these screening questions? Yes Filed at: 02/13/2021 1011   Initial Alcohol Screen: US AUDIT-C    1  How often do you have a drink containing alcohol?  0 Filed at: 02/13/2021 1011   2  How many drinks containing alcohol do you have on a typical day you are drinking? 0 Filed at: 02/13/2021 1011   3a  Male UNDER 65: How often do you have five or more drinks on one occasion? 0 Filed at: 02/13/2021 1011   3b  FEMALE Any Age, or MALE 65+: How often do you have 4 or more drinks on one occassion? 0 Filed at: 02/13/2021 1011   Audit-C Score  0 Filed at: 02/13/2021 1011   AMNA: How many times in the past year have you       Used an illegal drug or used a prescription medication for non-medical reasons? Never Filed at: 02/13/2021 1011                    [facility]  Number of Diagnoses or Management Options  Foot pain, right: established and worsening  Plantar fasciitis: established and worsening  Diagnosis management comments: No fx/dislocation  Chronic foot pain, likely plantar fasciitis related  Stable for d/c home and f/u with podiatrist        Amount and/or Complexity of Data Reviewed  Tests in the radiology section of CPT®: ordered and reviewed    Risk of Complications, Morbidity, and/or Mortality  Presenting problems: low  Diagnostic procedures: low  Management options: low    Patient Progress  Patient progress: stable      Disposition  Final diagnoses: Foot pain, right   Plantar fasciitis     Time reflects when diagnosis was documented in both MDM as applicable and the Disposition within this note     Time User Action Codes Description Comment    2/13/2021 10:48 [facility] [M79 671] Foot pain, right     2/13/2021 10:48 AM [facility] Emily Add [M72 2] Plantar fasciitis       ED Disposition     ED Disposition Condition Date/Time Comment    Discharge Stable Sat Feb 13, 2021 10:48 AM Luis F Nair discharge to home/self care              Follow-up Information     Follow up With Specialties Details Why Contact Info Additional Information    [facility] Podiat[facility] Schedule an appointment as soon as possible for a visit   [address] [facility] [phone]  [id number] [place] [address] Schedule an appointment as soon as possible for a visit   [address]  [address] [place] [address] Schedule an appointment as soon as possible for a visit   [address] [place] [address]  [phone]             Discharge Medication List as of 2/13/2021 10:50 AM      START taking these medications    Details   naproxen (EC NAPROSYN) 500 MG EC tablet Take 1 tablet (500 mg total) by mouth 2 (two) times a day with meals for 15 days, Starting Sat 2/13/2021, Until Sun 2/28/2021, Normal           No discharge procedures on file      PDMP Review       Value Time User    PDMP Reviewed  Yes 2/13/2021 10:21 AM Marce Barney DO          ED Provider  Electronically Signed by           Marce Barney DO  02/13/21 3561

## 2021-04-17 ENCOUNTER — IMMUNIZATIONS (OUTPATIENT)
Dept: FAMILY MEDICINE CLINIC | Facility: HOSPITAL | Age: 41
End: 2021-04-17

## 2021-04-17 DIAGNOSIS — Z23 ENCOUNTER FOR IMMUNIZATION: Primary | ICD-10-CM

## 2021-04-17 PROCEDURE — 91300 SARS-COV-2 / COVID-19 MRNA VACCINE (PFIZER-BIONTECH) 30 MCG: CPT

## 2021-04-17 PROCEDURE — 0001A SARS-COV-2 / COVID-19 MRNA VACCINE (PFIZER-BIONTECH) 30 MCG: CPT

## 2021-05-08 ENCOUNTER — IMMUNIZATIONS (OUTPATIENT)
Dept: FAMILY MEDICINE CLINIC | Facility: HOSPITAL | Age: 41
End: 2021-05-08

## 2021-05-08 DIAGNOSIS — Z23 ENCOUNTER FOR IMMUNIZATION: Primary | ICD-10-CM

## 2021-05-08 PROCEDURE — 0002A SARS-COV-2 / COVID-19 MRNA VACCINE (PFIZER-BIONTECH) 30 MCG: CPT

## 2021-05-08 PROCEDURE — 91300 SARS-COV-2 / COVID-19 MRNA VACCINE (PFIZER-BIONTECH) 30 MCG: CPT

## 2021-09-07 ENCOUNTER — DOCUMENTATION (OUTPATIENT)
Dept: OTHER | Facility: HOSPITAL | Age: 41
End: 2021-09-07

## 2021-09-07 DIAGNOSIS — Z01.818 PREOP EXAMINATION: Primary | ICD-10-CM

## 2021-09-13 ENCOUNTER — HOSPITAL ENCOUNTER (OUTPATIENT)
Dept: NON INVASIVE DIAGNOSTICS | Facility: HOSPITAL | Age: 41
Discharge: HOME | End: 2021-09-13
Attending: PODIATRIST
Payer: COMMERCIAL

## 2021-09-13 ENCOUNTER — APPOINTMENT (OUTPATIENT)
Dept: LAB | Facility: HOSPITAL | Age: 41
End: 2021-09-13
Attending: PODIATRIST
Payer: COMMERCIAL

## 2021-09-13 ENCOUNTER — HOSPITAL ENCOUNTER (OUTPATIENT)
Dept: RADIOLOGY | Facility: HOSPITAL | Age: 41
Discharge: HOME | End: 2021-09-13
Attending: PODIATRIST
Payer: COMMERCIAL

## 2021-09-13 DIAGNOSIS — Z01.818 PREOP EXAMINATION: ICD-10-CM

## 2021-09-13 LAB
ALBUMIN SERPL BCP-MCNC: 3.4 G/DL (ref 3.5–5)
ALP SERPL-CCNC: 81 U/L (ref 46–116)
ALT SERPL W P-5'-P-CCNC: 36 U/L (ref 12–78)
ANION GAP SERPL CALCULATED.3IONS-SCNC: 8 MMOL/L (ref 4–13)
AST SERPL W P-5'-P-CCNC: 22 U/L (ref 5–45)
BASOPHILS # BLD AUTO: 0.08 THOUSANDS/ΜL (ref 0–0.1)
BASOPHILS NFR BLD AUTO: 1 % (ref 0–1)
BILIRUB SERPL-MCNC: 0.54 MG/DL (ref 0.2–1)
BUN SERPL-MCNC: 15 MG/DL (ref 5–25)
CALCIUM ALBUM COR SERPL-MCNC: 9.2 MG/DL (ref 8.3–10.1)
CALCIUM SERPL-MCNC: 8.7 MG/DL (ref 8.3–10.1)
CHLORIDE SERPL-SCNC: 105 MMOL/L (ref 100–108)
CO2 SERPL-SCNC: 28 MMOL/L (ref 21–32)
CREAT SERPL-MCNC: 1.02 MG/DL (ref 0.6–1.3)
EOSINOPHIL # BLD AUTO: 0.76 THOUSAND/ΜL (ref 0–0.61)
EOSINOPHIL NFR BLD AUTO: 11 % (ref 0–6)
ERYTHROCYTE [DISTWIDTH] IN BLOOD BY AUTOMATED COUNT: 12.9 % (ref 11.6–15.1)
GFR SERPL CREATININE-BSD FRML MDRD: 92 ML/MIN/1.73SQ M
GLUCOSE P FAST SERPL-MCNC: 98 MG/DL (ref 65–99)
HCT VFR BLD AUTO: 46.2 % (ref 36.5–49.3)
HGB BLD-MCNC: 15 G/DL (ref 12–17)
IMM GRANULOCYTES # BLD AUTO: 0.01 THOUSAND/UL (ref 0–0.2)
IMM GRANULOCYTES NFR BLD AUTO: 0 % (ref 0–2)
LYMPHOCYTES # BLD AUTO: 1.84 THOUSANDS/ΜL (ref 0.6–4.47)
LYMPHOCYTES NFR BLD AUTO: 26 % (ref 14–44)
MCH RBC QN AUTO: 29.6 PG (ref 26.8–34.3)
MCHC RBC AUTO-ENTMCNC: 32.5 G/DL (ref 31.4–37.4)
MCV RBC AUTO: 91 FL (ref 82–98)
MONOCYTES # BLD AUTO: 0.69 THOUSAND/ΜL (ref 0.17–1.22)
MONOCYTES NFR BLD AUTO: 10 % (ref 4–12)
NEUTROPHILS # BLD AUTO: 3.77 THOUSANDS/ΜL (ref 1.85–7.62)
NEUTS SEG NFR BLD AUTO: 52 % (ref 43–75)
NRBC BLD AUTO-RTO: 0 /100 WBCS
PLATELET # BLD AUTO: 252 THOUSANDS/UL (ref 149–390)
PMV BLD AUTO: 9.3 FL (ref 8.9–12.7)
POTASSIUM SERPL-SCNC: 4.3 MMOL/L (ref 3.5–5.3)
PROT SERPL-MCNC: 6.9 G/DL (ref 6.4–8.2)
RBC # BLD AUTO: 5.06 MILLION/UL (ref 3.88–5.62)
SODIUM SERPL-SCNC: 141 MMOL/L (ref 136–145)
WBC # BLD AUTO: 7.15 THOUSAND/UL (ref 4.31–10.16)

## 2021-09-13 PROCEDURE — 36415 COLL VENOUS BLD VENIPUNCTURE: CPT

## 2021-09-13 PROCEDURE — 85025 COMPLETE CBC W/AUTO DIFF WBC: CPT

## 2021-09-13 PROCEDURE — 93005 ELECTROCARDIOGRAM TRACING: CPT

## 2021-09-13 PROCEDURE — 80053 COMPREHEN METABOLIC PANEL: CPT

## 2021-09-13 PROCEDURE — 73630 X-RAY EXAM OF FOOT: CPT

## 2021-09-13 PROCEDURE — 71046 X-RAY EXAM CHEST 2 VIEWS: CPT

## 2021-09-14 LAB
ATRIAL RATE: 65 BPM
P AXIS: 23 DEGREES
PR INTERVAL: 132 MS
QRS AXIS: 34 DEGREES
QRSD INTERVAL: 86 MS
QT INTERVAL: 406 MS
QTC INTERVAL: 422 MS
T WAVE AXIS: 24 DEGREES
VENTRICULAR RATE: 65 BPM

## 2021-09-14 PROCEDURE — 93010 ELECTROCARDIOGRAM REPORT: CPT | Performed by: INTERNAL MEDICINE

## 2021-09-21 NOTE — PRE-PROCEDURE INSTRUCTIONS
No outpatient medications have been marked as taking for the 9/27/21 encounter [facility] Encounter)  Pt denies fever, sob, sore throat and cough  Pt verbalized understanding of shower instructions  Pt instructed to stop nsaids and supplements one week prior to surgery

## 2021-09-24 ENCOUNTER — ANESTHESIA EVENT (OUTPATIENT)
Dept: PERIOP | Facility: HOSPITAL | Age: 41
End: 2021-09-24
Payer: COMMERCIAL

## 2021-09-27 ENCOUNTER — HOSPITAL ENCOUNTER (OUTPATIENT)
Facility: HOSPITAL | Age: 41
Setting detail: OUTPATIENT SURGERY
Discharge: HOME | End: 2021-09-27
Attending: PODIATRIST | Admitting: PODIATRIST
Payer: COMMERCIAL

## 2021-09-27 ENCOUNTER — APPOINTMENT (OUTPATIENT)
Dept: RADIOLOGY | Facility: HOSPITAL | Age: 41
End: 2021-09-27
Payer: COMMERCIAL

## 2021-09-27 ENCOUNTER — ANESTHESIA (OUTPATIENT)
Dept: PERIOP | Facility: HOSPITAL | Age: 41
End: 2021-09-27
Payer: COMMERCIAL

## 2021-09-27 ENCOUNTER — HOSPITAL ENCOUNTER (OUTPATIENT)
Dept: RADIOLOGY | Facility: HOSPITAL | Age: 41
Setting detail: OUTPATIENT SURGERY
Discharge: HOME | End: 2021-09-27
Payer: COMMERCIAL

## 2021-09-27 VITALS
BODY MASS INDEX: 35.94 KG/M2 | HEART RATE: 85 BPM | DIASTOLIC BLOOD PRESSURE: 76 MMHG | OXYGEN SATURATION: 96 % | TEMPERATURE: 97.4 F | WEIGHT: 280 LBS | SYSTOLIC BLOOD PRESSURE: 140 MMHG | HEIGHT: 74 IN | RESPIRATION RATE: 16 BRPM

## 2021-09-27 DIAGNOSIS — G89.18 POST-OPERATIVE PAIN: Primary | ICD-10-CM

## 2021-09-27 DIAGNOSIS — M20.41 OTHER HAMMER TOE(S) (ACQUIRED), RIGHT FOOT: ICD-10-CM

## 2021-09-27 DIAGNOSIS — Z98.890 POST-OPERATIVE STATE: ICD-10-CM

## 2021-09-27 PROBLEM — E66.9 OBESITY (BMI 35.0-39.9 WITHOUT COMORBIDITY): Status: ACTIVE | Noted: 2021-09-27

## 2021-09-27 PROBLEM — M41.9 SCOLIOSIS: Status: ACTIVE | Noted: 2021-09-27

## 2021-09-27 PROBLEM — J45.909 ASTHMA: Status: ACTIVE | Noted: 2021-09-27

## 2021-09-27 PROCEDURE — C1781 MESH (IMPLANTABLE): HCPCS | Performed by: PODIATRIST

## 2021-09-27 PROCEDURE — C1776 JOINT DEVICE (IMPLANTABLE): HCPCS | Performed by: PODIATRIST

## 2021-09-27 PROCEDURE — 73620 X-RAY EXAM OF FOOT: CPT

## 2021-09-27 PROCEDURE — 73630 X-RAY EXAM OF FOOT: CPT

## 2021-09-27 DEVICE — INTRAMEDULLARY ARTHRODESIS IMPLANT
Type: IMPLANTABLE DEVICE | Site: TOE | Status: FUNCTIONAL
Brand: SMART TOE

## 2021-09-27 DEVICE — (12 SQ CM) AMNIOBAND VIABLE MEMBRANE 3 X 4CM: Type: IMPLANTABLE DEVICE | Site: TOE | Status: FUNCTIONAL

## 2021-09-27 RX ORDER — LIDOCAINE HYDROCHLORIDE AND EPINEPHRINE 10; 10 MG/ML; UG/ML
INJECTION, SOLUTION INFILTRATION; PERINEURAL AS NEEDED
Status: DISCONTINUED | OUTPATIENT
Start: 2021-09-27 | End: 2021-09-27 | Stop reason: HOSPADM

## 2021-09-27 RX ORDER — KETOROLAC TROMETHAMINE 30 MG/ML
INJECTION, SOLUTION INTRAMUSCULAR; INTRAVENOUS AS NEEDED
Status: DISCONTINUED | OUTPATIENT
Start: 2021-09-27 | End: 2021-09-27

## 2021-09-27 RX ORDER — FENTANYL CITRATE 50 UG/ML
50 INJECTION, SOLUTION INTRAMUSCULAR; INTRAVENOUS
Status: DISCONTINUED | OUTPATIENT
Start: 2021-09-27 | End: 2021-09-27 | Stop reason: HOSPADM

## 2021-09-27 RX ORDER — ONDANSETRON 2 MG/ML
4 INJECTION INTRAMUSCULAR; INTRAVENOUS EVERY 6 HOURS PRN
Status: DISCONTINUED | OUTPATIENT
Start: 2021-09-27 | End: 2021-09-27 | Stop reason: HOSPADM

## 2021-09-27 RX ORDER — MIDAZOLAM HYDROCHLORIDE 2 MG/2ML
INJECTION, SOLUTION INTRAMUSCULAR; INTRAVENOUS AS NEEDED
Status: DISCONTINUED | OUTPATIENT
Start: 2021-09-27 | End: 2021-09-27

## 2021-09-27 RX ORDER — SODIUM CHLORIDE 9 MG/ML
125 INJECTION, SOLUTION INTRAVENOUS CONTINUOUS
Status: DISCONTINUED | OUTPATIENT
Start: 2021-09-27 | End: 2021-09-27 | Stop reason: HOSPADM

## 2021-09-27 RX ORDER — CEFAZOLIN SODIUM 2 G/50ML
2000 SOLUTION INTRAVENOUS ONCE
Status: COMPLETED | OUTPATIENT
Start: 2021-09-27 | End: 2021-09-27

## 2021-09-27 RX ORDER — ONDANSETRON 2 MG/ML
INJECTION INTRAMUSCULAR; INTRAVENOUS AS NEEDED
Status: DISCONTINUED | OUTPATIENT
Start: 2021-09-27 | End: 2021-09-27

## 2021-09-27 RX ORDER — DEXAMETHASONE SODIUM PHOSPHATE 4 MG/ML
INJECTION, SOLUTION INTRA-ARTICULAR; INTRALESIONAL; INTRAMUSCULAR; INTRAVENOUS; SOFT TISSUE AS NEEDED
Status: DISCONTINUED | OUTPATIENT
Start: 2021-09-27 | End: 2021-09-27

## 2021-09-27 RX ORDER — BUPIVACAINE HYDROCHLORIDE 5 MG/ML
INJECTION, SOLUTION PERINEURAL AS NEEDED
Status: DISCONTINUED | OUTPATIENT
Start: 2021-09-27 | End: 2021-09-27 | Stop reason: HOSPADM

## 2021-09-27 RX ORDER — FENTANYL CITRATE 50 UG/ML
INJECTION, SOLUTION INTRAMUSCULAR; INTRAVENOUS AS NEEDED
Status: DISCONTINUED | OUTPATIENT
Start: 2021-09-27 | End: 2021-09-27

## 2021-09-27 RX ORDER — PROPOFOL 10 MG/ML
INJECTION, EMULSION INTRAVENOUS AS NEEDED
Status: DISCONTINUED | OUTPATIENT
Start: 2021-09-27 | End: 2021-09-27

## 2021-09-27 RX ORDER — OXYCODONE HYDROCHLORIDE 5 MG/1
5 TABLET ORAL EVERY 4 HOURS PRN
Status: DISCONTINUED | OUTPATIENT
Start: 2021-09-27 | End: 2021-09-27 | Stop reason: HOSPADM

## 2021-09-27 RX ORDER — GLYCOPYRROLATE 0.2 MG/ML
INJECTION INTRAMUSCULAR; INTRAVENOUS AS NEEDED
Status: DISCONTINUED | OUTPATIENT
Start: 2021-09-27 | End: 2021-09-27

## 2021-09-27 RX ORDER — OXYCODONE HYDROCHLORIDE AND ACETAMINOPHEN 5; 325 MG/1; MG/1
1 TABLET ORAL EVERY 4 HOURS PRN
Qty: 30 TABLET | Refills: 0 | Status: SHIPPED | OUTPATIENT
Start: 2021-09-27 | End: 2021-10-07

## 2021-09-27 RX ADMIN — MIDAZOLAM 2 MG: 1 INJECTION INTRAMUSCULAR; INTRAVENOUS at 14:04

## 2021-09-27 RX ADMIN — FENTANYL CITRATE 100 MCG: 50 INJECTION INTRAMUSCULAR; INTRAVENOUS at 14:15

## 2021-09-27 RX ADMIN — CEFAZOLIN SODIUM 3000 MG: 2 SOLUTION INTRAVENOUS at 14:02

## 2021-09-27 RX ADMIN — FENTANYL CITRATE 50 MCG: 50 INJECTION INTRAMUSCULAR; INTRAVENOUS at 15:57

## 2021-09-27 RX ADMIN — DEXAMETHASONE SODIUM PHOSPHATE 8 MG: 4 INJECTION INTRA-ARTICULAR; INTRALESIONAL; INTRAMUSCULAR; INTRAVENOUS; SOFT TISSUE at 14:17

## 2021-09-27 RX ADMIN — SODIUM CHLORIDE: 0.9 INJECTION, SOLUTION INTRAVENOUS at 16:44

## 2021-09-27 RX ADMIN — FENTANYL CITRATE 50 MCG: 50 INJECTION INTRAMUSCULAR; INTRAVENOUS at 16:34

## 2021-09-27 RX ADMIN — SODIUM CHLORIDE 125 ML/HR: 0.9 INJECTION, SOLUTION INTRAVENOUS at 13:24

## 2021-09-27 RX ADMIN — ONDANSETRON 4 MG: 2 INJECTION INTRAMUSCULAR; INTRAVENOUS at 16:44

## 2021-09-27 RX ADMIN — KETOROLAC TROMETHAMINE 30 MG: 30 INJECTION, SOLUTION INTRAMUSCULAR at 17:13

## 2021-09-27 RX ADMIN — LIDOCAINE HYDROCHLORIDE 100 MG: 20 INJECTION INTRAVENOUS at 14:15

## 2021-09-27 RX ADMIN — FENTANYL CITRATE 50 MCG: 50 INJECTION INTRAMUSCULAR; INTRAVENOUS at 15:01

## 2021-09-27 RX ADMIN — PROPOFOL 200 MG: 10 INJECTION, EMULSION INTRAVENOUS at 14:15

## 2021-09-27 RX ADMIN — SODIUM CHLORIDE: 0.9 INJECTION, SOLUTION INTRAVENOUS at 14:36

## 2021-09-27 RX ADMIN — GLYCOPYRROLATE 0.2 MG: 0.2 INJECTION, SOLUTION INTRAMUSCULAR; INTRAVENOUS at 14:19

## 2021-09-27 RX ADMIN — FENTANYL CITRATE 50 MCG: 50 INJECTION INTRAMUSCULAR; INTRAVENOUS at 14:30

## 2021-09-27 NOTE — OP NOTE
OPERATIVE REPORT - Podiatry  PATIENT NAME: Earnest Hernandez    :  1980  MRN: [id number]  Pt Location: [facility]OM 03    SURGERY DATE: 2021    Surgeon(s) and Role:     * Nikita Macias DPM - Primary     * Lakshmi Harmon DPM - Assisting     * Richard Brown DPM - Assisting    Pre-op Diagnosis:  Other hammer toe(s) (acquired), right foot [M20 41]  Secondary osteoarthritis, right ankle and foot [M19 271]    Post-Op Diagnosis Codes:     * Other hammer toe(s) (acquired), right foot [M20 41]     * Secondary osteoarthritis, right ankle and foot [M19 271]    Procedure(s) (LRB):  Modified Duque Bunionectomy (Right)  2,3,4 MPJ Soft Tissue Release (Right)  2,3,4 PIPJ Arthrodesis with implant (Right)    Specimen(s):  * No specimens in log *    Estimated Blood Loss:   20 mL    Drains:  * No LDAs found *    Anesthesia Type:   Choice with 20 ml of 1% Lidocaine and 0 5% Bupivacaine in a 1:1 mixture    Hemostasis:  Pneumatic ankle tourniquet set at 250 mmHg for 120 mins  Direct compression, electrocautery    Materials:  Implant Name Type Inv   Item Serial No   Lot No  LRB No  Used Action   (12 SQ CM) AMNIOBAND VIABLE MEMBRANE 3 X 4CM - [id number]  (12 SQ CM) AMNIOBAND VIABLE MEMBRANE 3 X 4CM [id number] [facility] [id number] Right 1 Implanted   IMPLANT SMART TOE II 19MM TITANIUM NITINOL - SST0-19P  IMPLANT SMART TOE II 19MM TITANIUM NITINOL ST0-19P [employer] [id number] Right 1 Implanted   IMPLANT SMART TOE II 19MM TITANIUM NITINOL - SST0-19P  IMPLANT SMART TOE II 19MM TITANIUM NITINOL ST0-19P [employer] [id number] Right 1 Implanted   IMPLANT SMART TOE II 16MM TITANIUM NITINOL - SST0-16P  IMPLANT SMART TOE II 16MM TITANIUM NITINOL ST0-16P [employer] [id number] Right 1 Implanted     3-0 Vicryl  4-0 Vicryl  4-0 Nylon    Injectables:  10 mL 0 5% bupivicaine plain    Operative Findings:  Consistent with Diagnosis    Complications:   None    Procedure and Technique:     Under mild sedation, the patient was brought into the operating room and placed on the operating room table in the supine position  IV sedation was achieved by anesthesia team and a universal timeout was performed where all parties are in agreement of correct patient, correct procedure and correct site  A pneumatic tourniquet was then placed over the patient's right lower extremity with ample padding  An ankle block was performed consisting of 20 ml of 1% Lidocaine and 0 5% Bupivacaine in a 1:1 mixture  The foot was then prepped and draped in the usual aseptic manner  An esmarch bandage was used to exsangunate the foot and the pneumatic tourniquet was then inflated to 250 mmHg  Attention was then directed to the right first MPJ where an approximately 5 cm dorsilinear incision was made utilizing a scalpel  Blunt dissection was carried through the subcutaneous tissue with care taken to protect any vital neurovascular structures  Cautery was utilized as needed  The extensor tendon was identified and retracted laterally  A linear capsulotomy was performed at the MPJ  The medial collateral ligament were excised  Attention was then directed to the first interspace where a lateral release was performed  Attention was then directed back to the medial first MPJ where a capsulorraphy was performed  The surgical site was irrigated with saline  Capsular closer was obtained utilizing 3-0 vicryl  Subcutaneous closure was obtained utilizing 4-0 vicryl  Skin margins were reapproximated utilizing 4-0 nylon  The hallux deformity was noted to sit in a more anatomic position  Attention was then directed to the right 2nd toe  A hammertoe deformity was present  A  dorsal linear incision was made from the metatarsalphalangeal joint to the proximal interphalangeal joint  The incision was then deepened via sharp dissection through the subcutaneous tissues, ligating all venous vessels as necessary  Dissection was carried to the level of the EDL tendon   The tendon was then transected at that level  The PIPJ was then exposed and the medial and lateral collateral ligaments were incised to expose the head of the proximal phalanx  By use of sagittal saw, the head of the proximal phalanx was resected  Push test showed that there was still a dorsal flexion contracture was still present at the metatarsalphalangeal joint  A capsulotomy was then performed at the metatarsalphalangeal joint and a mcglamry elevator was used to release adhesions off the plantar aspect of the metatarsal phalangeal joint  A push test now showed a more normal rectus position of the digit  A sagittal saw was used to remove the articular cartilage off of the base of the middle phalange and the wound was then flushed with copious amounts of normal saline solution  At this time, a [employer] smart toe implant was used to maintain corrected positition of the hammertoe, inserting the the implant in the proximal and middle phalanx  Correct position was verified on C arm  The wound was then flushed with copious amounts of normal sterile solution  The extensor tendon was then reapproximated and maintained a lengthened position with 3-0 Vicryl  Amnioband was placed within the surgical incision  Subcutaneous tissue was then reapproximated with 4-0 Vicryl  Skin edges were reapproximated with 4-0 Nylon in a horizontal mattress suture technique  Attention was then directed to the right 3rd toe  A hammertoe deformity was present  A  dorsal linear incision was made from the metatarsalphalangeal joint to the proximal interphalangeal joint  The incision was then deepened via sharp dissection through the subcutaneous tissues, ligating all venous vessels as necessary  Dissection was carried to the level of the EDL tendon  The tendon was then transected at that level  The PIPJ was then exposed and the medial and lateral collateral ligaments were incised to expose the head of the proximal phalanx   By use of sagittal saw, the head of the proximal phalanx was resected  Push test showed that there was still a dorsal flexion contracture was still present at the metatarsalphalangeal joint  A capsulotomy was then performed at the metatarsalphalangeal joint and a mcglamry elevator was used to release adhesions off the plantar aspect of the metatarsal phalangeal joint  A push test now showed a more normal rectus position of the digit  A sagittal saw was used to remove the articular cartilage off of the base of the middle phalange and the wound was then flushed with copious amounts of normal saline solution  At this time, a [employer] smart toe implant was used to maintain corrected positition of the hammertoe, inserting the the implant in the proximal and middle phalanx  Correct position was verified on C arm  The wound was then flushed with copious amounts of normal sterile solution  The extensor tendon was then reapproximated and maintained a lengthened position with 3-0 Vicryl  Amnioband was placed within the surgical incision  Subcutaneous tissue was then reapproximated with 4-0 Vicryl  Skin edges were reapproximated with 4-0 Nylon in a horizontal mattress suture technique  Attention was then directed to the right 4th toe  A hammertoe deformity was present  A  dorsal linear incision was made from the metatarsalphalangeal joint to the proximal interphalangeal joint  The incision was then deepened via sharp dissection through the subcutaneous tissues, ligating all venous vessels as necessary  Dissection was carried to the level of the EDL tendon  The tendon was then transected at that level  The PIPJ was then exposed and the medial and lateral collateral ligaments were incised to expose the head of the proximal phalanx  By use of sagittal saw, the head of the proximal phalanx was resected  Push test showed that there was still a dorsal flexion contracture was still present at the metatarsalphalangeal joint   A capsulotomy was then performed at the metatarsalphalangeal joint and a mcglamry elevator was used to release adhesions off the plantar aspect of the metatarsal phalangeal joint  A push test now showed a more normal rectus position of the digit  A sagittal saw was used to remove the articular cartilage off of the base of the middle phalange and the wound was then flushed with copious amounts of normal saline solution  At this time, a [employer] smart toe implant was used to maintain corrected positition of the hammertoe, inserting the the implant in the proximal and middle phalanx  Correct position was verified on C arm  The wound was then flushed with copious amounts of normal sterile solution  The extensor tendon was then reapproximated and maintained a lengthened position with 3-0 Vicryl  Amnioband was placed within the surgical incision  Subcutaneous tissue was then reapproximated with 4-0 Vicryl  Skin edges were reapproximated with 4-0 Nylon in a horizontal mattress suture technique  The foot was then cleansed and dried  A postoperative injection consisting of 10 ml of 0 5% Bupivacaine was performed  The incision site was dressed with xeroform, gauze  This was then covered with a Dona and an ACE wrap  The tourniquet was deflated at approximately 120 min and normal hyperemic response was noted to all digits  The patient tolerated the procedure and anesthesia well without immediate complications and transferred to PACU with vital signs stable  Dr Corina Macias was present during the entire procedure and participated in all key aspects  SIGNATURE: Otilio Capone DPM  DATE: September 27, 2021  TIME: 5:34 PM      Portions of the record may have been created with voice recognition software  Occasional wrong word or "sound a like" substitutions may have occurred due to the inherent limitations of voice recognition software  Read the chart carefully and recognize, using context, where substitutions have occurred

## 2021-09-27 NOTE — ANESTHESIA PREPROCEDURE EVALUATION
Procedure:  1ST MPJ CHEILECTOMY (Right Toe)  2,3,4 HAMMERTOE REPAIR (Right Foot)    Relevant Problems   MUSCULOSKELETAL   (+) Scoliosis      NEURO/PSYCH   (+) Anxiety   (+) Depression      PULMONARY   (+) Asthma      Other   (+) Obesity (BMI 35 0-39 9 without comorbidity)        Physical Exam    Airway    Mallampati score: III  TM Distance: >3 FB  Neck ROM: full     Dental   No notable dental hx     Cardiovascular  Rhythm: regular, Rate: normal, Cardiovascular exam normal    Pulmonary  Pulmonary exam normal Breath sounds clear to auscultation,     Other Findings        Anesthesia Plan  ASA Score- 3     Anesthesia Type- general with ASA Monitors  Additional Monitors:   Airway Plan: LMA  Plan Factors-    Chart reviewed  Patient summary reviewed  Patient is not a current smoker  Patient instructed to abstain from smoking on day of procedure  Patient did not smoke on day of surgery  Induction- intravenous  Postoperative Plan-     Informed Consent- Anesthetic plan and risks discussed with patient and spouse Elizabeth Heredia

## 2021-09-27 NOTE — DISCHARGE SUMMARY
Discharge Summary Outpatient Procedure Podiatry -   Nikole Quiroga 37 y o  male MRN: [id number]  Unit/Bed#: OR POOL Encounter: [id number]    Admission Date: 9/27/2021     Admitting Diagnosis: Other hammer toe(s) (acquired), right foot [M20 41]  Secondary osteoarthritis, right ankle and foot [M19 271]    Discharge Diagnosis: same    Procedures Performed: 1ST MPJ CHEILECTOMY: 70414 (CPT®)  2,3,4 HAMMERTOE REPAIR: 18312 (CPT®)    Complications: none    Condition at Discharge: stable    Discharge instructions/Information to patient and family:   See after visit summary for information provided to patient and family  Provisions for Follow-Up Care/Important appointments:  See after visit summary for information related to follow-up care and any pertinent home health orders  Discharge Medications:  See after visit summary for reconciled discharge medications provided to patient and family

## 2021-09-27 NOTE — DISCHARGE INSTRUCTIONS
Dr Lexi Baker Instructions    1  Take your prescribed medication as directed  2  Upon arrival at home, lie down and elevate your surgical foot on 2 pillows  3  Remain quiet, off your feet as much as possible, for the first 24-48 hours  This is when your feet first swell and may become painful  After 48 hours you may begin limited walking following these restrictions:   Partial weightbearing to heel of surgical foot in post operative shoe  4  Drink large quantities of water  Consume no alcohol  Continue a well-balanced diet  5  Report any unusual discomfort or fever to this office  6  A limited amount of discomfort and swelling is to be expected  In some cases the skin may take on a bruised appearance  The surgical solution that was applied to your foot prior to the operation is dark in color and the operation site may appear to be oozing when it actually is not  7  A slight amount of blood is to be expected, and is no cause for alarm  Do not remove the dressings  If there is active bleeding and if the bleeding persists, add additional gauze to the bandage, apply direct pressure, elevate your feet and call this office  8  Do not get the dressings wet  As regular bathing may be inconvenient, sponge baths are recommended  9  When anesthesia wears off and if any discomfort should be present, apply an ice pack directly behind the knee for 15 minute intervals for several hours or until the pain leaves  (USE IN EXCESS OF 15 MINUTES COULD CAUSE FROSTBITE)  Do not use hot water bags or electric pads  A convenient icepack can be made by placing ice cubes in a plastic bag and covering this with a towel  10  If necessary, take a mild laxative before retiring  11  Wear your special open shoes anytime you put weight on your foot, even if it is just to walk to the bathroom and back  It will probably be 2 or 3 weeks before you will be permitted to try regular shoes    12  Having performed the operation, we are interested in a prompt recovery  Please cooperate by following the above instructions  13  Please call to confirm your post-op appointment or call with any other questions    14  OTC Advil double action as needed

## 2021-10-12 ENCOUNTER — HOSPITAL ENCOUNTER (EMERGENCY)
Facility: HOSPITAL | Age: 41
Discharge: HOME | End: 2021-10-12
Attending: EMERGENCY MEDICINE
Payer: COMMERCIAL

## 2021-10-12 VITALS
TEMPERATURE: 98.3 F | OXYGEN SATURATION: 98 % | SYSTOLIC BLOOD PRESSURE: 162 MMHG | DIASTOLIC BLOOD PRESSURE: 105 MMHG | RESPIRATION RATE: 20 BRPM | HEART RATE: 103 BPM

## 2021-10-12 DIAGNOSIS — Z48.02 ENCOUNTER FOR REMOVAL OF SUTURES: Primary | ICD-10-CM

## 2021-10-12 PROCEDURE — 99284 EMERGENCY DEPT VISIT MOD MDM: CPT | Performed by: EMERGENCY MEDICINE

## 2021-10-12 PROCEDURE — 99281 EMR DPT VST MAYX REQ PHY/QHP: CPT

## 2021-10-12 PROCEDURE — 96372 THER/PROPH/DIAG INJ SC/IM: CPT

## 2021-10-12 RX ORDER — KETOROLAC TROMETHAMINE 30 MG/ML
15 INJECTION, SOLUTION INTRAMUSCULAR; INTRAVENOUS ONCE
Status: COMPLETED | OUTPATIENT
Start: 2021-10-12 | End: 2021-10-12

## 2021-10-12 RX ORDER — LORAZEPAM 0.5 MG/1
0.5 TABLET ORAL ONCE
Status: COMPLETED | OUTPATIENT
Start: 2021-10-12 | End: 2021-10-12

## 2021-10-12 RX ADMIN — LORAZEPAM 0.5 MG: 0.5 TABLET ORAL at 17:45

## 2021-10-12 RX ADMIN — KETOROLAC TROMETHAMINE 15 MG: 30 INJECTION, SOLUTION INTRAMUSCULAR; INTRAVENOUS at 17:45

## 2021-10-27 ENCOUNTER — EVALUATION (OUTPATIENT)
Dept: PHYSICAL THERAPY | Facility: CLINIC | Age: 41
End: 2021-10-27
Payer: COMMERCIAL

## 2021-10-27 DIAGNOSIS — M79.674 GREAT TOE PAIN, RIGHT: Primary | ICD-10-CM

## 2021-10-27 DIAGNOSIS — Z51.89 AFTERCARE: ICD-10-CM

## 2021-10-27 DIAGNOSIS — M79.671 RIGHT FOOT PAIN: ICD-10-CM

## 2021-10-27 PROCEDURE — 97162 PT EVAL MOD COMPLEX 30 MIN: CPT | Performed by: PHYSICAL THERAPIST

## 2021-10-29 ENCOUNTER — OFFICE VISIT (OUTPATIENT)
Dept: PHYSICAL THERAPY | Facility: CLINIC | Age: 41
End: 2021-10-29
Payer: COMMERCIAL

## 2021-10-29 DIAGNOSIS — M79.671 RIGHT FOOT PAIN: ICD-10-CM

## 2021-10-29 DIAGNOSIS — Z51.89 AFTERCARE: ICD-10-CM

## 2021-10-29 DIAGNOSIS — M79.674 GREAT TOE PAIN, RIGHT: Primary | ICD-10-CM

## 2021-10-29 PROCEDURE — 97140 MANUAL THERAPY 1/> REGIONS: CPT

## 2021-10-29 PROCEDURE — 97110 THERAPEUTIC EXERCISES: CPT

## 2021-10-29 PROCEDURE — 97112 NEUROMUSCULAR REEDUCATION: CPT

## 2021-11-02 ENCOUNTER — OFFICE VISIT (OUTPATIENT)
Dept: PHYSICAL THERAPY | Facility: CLINIC | Age: 41
End: 2021-11-02
Payer: COMMERCIAL

## 2021-11-02 DIAGNOSIS — Z51.89 AFTERCARE: ICD-10-CM

## 2021-11-02 DIAGNOSIS — M79.671 RIGHT FOOT PAIN: ICD-10-CM

## 2021-11-02 DIAGNOSIS — M79.674 GREAT TOE PAIN, RIGHT: Primary | ICD-10-CM

## 2021-11-02 PROCEDURE — 97112 NEUROMUSCULAR REEDUCATION: CPT

## 2021-11-02 PROCEDURE — 97110 THERAPEUTIC EXERCISES: CPT

## 2021-11-02 PROCEDURE — 97140 MANUAL THERAPY 1/> REGIONS: CPT

## 2021-11-05 ENCOUNTER — OFFICE VISIT (OUTPATIENT)
Dept: PHYSICAL THERAPY | Facility: CLINIC | Age: 41
End: 2021-11-05
Payer: COMMERCIAL

## 2021-11-05 DIAGNOSIS — M79.671 RIGHT FOOT PAIN: ICD-10-CM

## 2021-11-05 DIAGNOSIS — M79.674 GREAT TOE PAIN, RIGHT: Primary | ICD-10-CM

## 2021-11-05 DIAGNOSIS — Z51.89 AFTERCARE: ICD-10-CM

## 2021-11-05 PROCEDURE — 97140 MANUAL THERAPY 1/> REGIONS: CPT

## 2021-11-05 PROCEDURE — 97110 THERAPEUTIC EXERCISES: CPT

## 2021-11-05 PROCEDURE — 97112 NEUROMUSCULAR REEDUCATION: CPT

## 2021-11-09 ENCOUNTER — OFFICE VISIT (OUTPATIENT)
Dept: PHYSICAL THERAPY | Facility: CLINIC | Age: 41
End: 2021-11-09
Payer: COMMERCIAL

## 2021-11-09 DIAGNOSIS — M79.674 GREAT TOE PAIN, RIGHT: Primary | ICD-10-CM

## 2021-11-09 DIAGNOSIS — M79.671 RIGHT FOOT PAIN: ICD-10-CM

## 2021-11-09 DIAGNOSIS — Z51.89 AFTERCARE: ICD-10-CM

## 2021-11-09 PROCEDURE — 97530 THERAPEUTIC ACTIVITIES: CPT

## 2021-11-09 PROCEDURE — 97110 THERAPEUTIC EXERCISES: CPT

## 2021-11-09 PROCEDURE — 97140 MANUAL THERAPY 1/> REGIONS: CPT

## 2021-11-09 PROCEDURE — 97112 NEUROMUSCULAR REEDUCATION: CPT

## 2021-11-12 ENCOUNTER — OFFICE VISIT (OUTPATIENT)
Dept: PHYSICAL THERAPY | Facility: CLINIC | Age: 41
End: 2021-11-12
Payer: COMMERCIAL

## 2021-11-12 DIAGNOSIS — M79.674 GREAT TOE PAIN, RIGHT: Primary | ICD-10-CM

## 2021-11-12 DIAGNOSIS — M79.671 RIGHT FOOT PAIN: ICD-10-CM

## 2021-11-12 DIAGNOSIS — Z51.89 AFTERCARE: ICD-10-CM

## 2021-11-12 PROCEDURE — 97112 NEUROMUSCULAR REEDUCATION: CPT

## 2021-11-12 PROCEDURE — 97110 THERAPEUTIC EXERCISES: CPT

## 2021-11-12 PROCEDURE — 97140 MANUAL THERAPY 1/> REGIONS: CPT

## 2021-11-12 PROCEDURE — 97530 THERAPEUTIC ACTIVITIES: CPT

## 2021-11-23 ENCOUNTER — APPOINTMENT (OUTPATIENT)
Dept: PHYSICAL THERAPY | Facility: CLINIC | Age: 41
End: 2021-11-23
Payer: COMMERCIAL

## 2021-11-26 ENCOUNTER — APPOINTMENT (OUTPATIENT)
Dept: PHYSICAL THERAPY | Facility: CLINIC | Age: 41
End: 2021-11-26
Payer: COMMERCIAL

## 2021-11-30 ENCOUNTER — APPOINTMENT (OUTPATIENT)
Dept: PHYSICAL THERAPY | Facility: CLINIC | Age: 41
End: 2021-11-30
Payer: COMMERCIAL

## 2022-04-11 ENCOUNTER — APPOINTMENT (EMERGENCY)
Dept: RADIOLOGY | Facility: HOSPITAL | Age: 42
End: 2022-04-11
Payer: MEDICARE

## 2022-04-11 ENCOUNTER — HOSPITAL ENCOUNTER (EMERGENCY)
Facility: HOSPITAL | Age: 42
Discharge: HOME | End: 2022-04-11
Attending: EMERGENCY MEDICINE | Admitting: EMERGENCY MEDICINE
Payer: MEDICARE

## 2022-04-11 VITALS
TEMPERATURE: 97.8 F | DIASTOLIC BLOOD PRESSURE: 71 MMHG | RESPIRATION RATE: 18 BRPM | WEIGHT: 292.99 LBS | SYSTOLIC BLOOD PRESSURE: 118 MMHG | HEIGHT: 73 IN | HEART RATE: 66 BPM | BODY MASS INDEX: 38.83 KG/M2 | OXYGEN SATURATION: 100 %

## 2022-04-11 DIAGNOSIS — J02.8 PHARYNGITIS DUE TO OTHER ORGANISM: Primary | ICD-10-CM

## 2022-04-11 LAB — S PYO DNA THROAT QL NAA+PROBE: NOT DETECTED

## 2022-04-11 PROCEDURE — 99284 EMERGENCY DEPT VISIT MOD MDM: CPT | Performed by: EMERGENCY MEDICINE

## 2022-04-11 PROCEDURE — 87651 STREP A DNA AMP PROBE: CPT | Performed by: EMERGENCY MEDICINE

## 2022-04-11 PROCEDURE — 96372 THER/PROPH/DIAG INJ SC/IM: CPT

## 2022-04-11 PROCEDURE — 99283 EMERGENCY DEPT VISIT LOW MDM: CPT

## 2022-04-11 PROCEDURE — 70360 X-RAY EXAM OF NECK: CPT

## 2022-04-11 RX ORDER — PREDNISONE 20 MG/1
20 TABLET ORAL 2 TIMES DAILY WITH MEALS
Qty: 10 TABLET | Refills: 0 | Status: SHIPPED | OUTPATIENT
Start: 2022-04-11

## 2022-04-11 RX ORDER — DEXAMETHASONE SODIUM PHOSPHATE 4 MG/ML
8 INJECTION, SOLUTION INTRA-ARTICULAR; INTRALESIONAL; INTRAMUSCULAR; INTRAVENOUS; SOFT TISSUE ONCE
Status: COMPLETED | OUTPATIENT
Start: 2022-04-11 | End: 2022-04-11

## 2022-04-11 RX ADMIN — DEXAMETHASONE SODIUM PHOSPHATE 8 MG: 4 INJECTION, SOLUTION INTRAMUSCULAR; INTRAVENOUS at 11:59

## 2022-04-11 NOTE — ED PROVIDER NOTES
History  Chief Complaint   Patient presents with    Neck Pain     reports L sided neck pain and swelling worsening since yesterday, traveling to [facility], denies fever     HPI  Is a 45-year-old male presenting to us with pharyngitis  The patient states that he has had the symptoms for roughly 24 hours  He complains of some left-sided otalgia as well  Patient denies any other associated symptoms including cough fevers  Patient denies any other medical problems at this time  None       Past Medical History:   Diagnosis Date    Asthma     Scoliosis        Past Surgical History:   Procedure Laterality Date    NO PAST SURGERIES      RI HALLUX RIGIDUS W/CHEILECTOMY 1ST MP JT W/O IMPLT Right 9/27/2021    Procedure: 1ST MPJ CHEILECTOMY;  Surgeon: Emma Torres DPM;  Location: [facility];  Service: Podiatry    RI REPAIR OF Pravin  Right 9/27/2021    Procedure: 2,3,4 HAMMERTOE REPAIR;  Surgeon: Emma Torres DPM;  Location: [facility];  Service: Podiatry       History reviewed  No pertinent family history  I have reviewed and agree with the history as documented  E-Cigarette/Vaping    E-Cigarette Use Current Every Day User      E-Cigarette/Vaping Substances    Nicotine No     THC No     CBD No     Flavoring No     Other No     Unknown No      Social History     Tobacco Use    Smoking status: Former Smoker     Packs/day: 0 50     Types: Cigarettes    Smokeless tobacco: Never Used   Vaping Use    Vaping Use: Every day   Substance Use Topics    Alcohol use: Yes     Comment: social    Drug use: Yes     Types: Marijuana       Review of Systems  Review of systems  Constitutional symptoms:  Negative except as documented in HPI  Skin symptoms: Negative except as documented in HPI  Eye symptoms: Negative except as documented in HPI  ENMT symptoms: Negative except as documented in HPI  Respiratory symptoms: Negative except as documented in HPI    Cardiovascular symptoms: Negative except as documented in HPI  Gastrointestinal symptoms: Negative except as documented in HPI   symptoms: Negative except as documented in HPI  Musculoskeletal symptoms: Negative except as documented in HPI  Neurologic symptoms: Negative except as documented in HPI  Psychiatric symptoms: Negative except as documented in HPI  Endocrine symptoms: Negative except as documented in HPI  Hematologic/lymphatic symptoms: Negative except as documented in HPI  Allergy/immunologic symptoms: Negative except as documented in HPI  Physical Exam  Physical Exam  General:  No acute distress  Skin:  Warm  Head:  Normocephalic, atraumatic  Neck:  Trachea midline  Eye:  Extraocular movements are intact  Cardiovascular:  Regular rate and rhythm  Respiratory:  Lungs are clear to auscultation, respirations are nonlabored, breath sounds are equal bilaterally  Chest wall:  No tenderness  Musculoskeletal:  Normal range of motion, no tenderness, no swelling  Gastrointestinal:  Soft, nontender, guarding:  Negative, rebound:  Negative, bowel sounds:  Normal   Neurologic:  Alert and oriented to person place, time and situation    Patient moving all 4 extremities spontaneously    Vital Signs  ED Triage Vitals   Temperature Pulse Respirations Blood Pressure SpO2   04/11/22 1112 04/11/22 1111 04/11/22 1111 04/11/22 1112 04/11/22 1111   97 8 °F (36 6 °C) 66 18 118/71 100 %      Temp Source Heart Rate Source Patient Position - Orthostatic VS BP Location FiO2 (%)   04/11/22 1111 04/11/22 1111 04/11/22 1111 04/11/22 1111 --   Oral Monitor Sitting Left arm       Pain Score       04/11/22 1111       8           Vitals:    04/11/22 1111 04/11/22 1112   BP:  118/71   Pulse: 66    Patient Position - Orthostatic VS: Sitting          Visual Acuity      ED Medications  Medications   dexamethasone (DECADRON) injection 8 mg (8 mg Intramuscular Given 4/11/22 1159)       Diagnostic Studies  Results Reviewed     Procedure Component Value Units Date/Time Strep A PCR [[id number]]  (Normal) Collected: 04/11/22 1127    Lab Status: Final result Specimen: Throat Updated: 04/11/22 1204     STREP A PCR Not Detected                 XR neck soft tissue   Final Result by Alexandria Grace DO (04/11 1211)      Poorly demarcated epiglottis  Correlate with any clinical findings  If relevant, CT neck with IV contrast could be considered  Question asymmetric opacification of the right maxillary sinus  Otherwise, unremarkable study  Workstation performed: [id number]                    Procedures  Procedures         ED Course                                             MDM  They proceed with soft tissue neck x-ray as well as rapid strep and symptomatic treatment  Patient in no acute distress and tolerating p o  Intake  Patient appropriate for discharge home with outpatient follow-up  Disposition  Final diagnoses:   Pharyngitis due to other organism     Time reflects when diagnosis was documented in both MDM as applicable and the Disposition within this note     Time User Action Codes Description Comment    4/11/2022 12:14 PM Laneymonetfrancisca Stacierachel [J02 8] Pharyngitis due to other organism       ED Disposition     ED Disposition Condition Date/Time Comment    Discharge Stable Mon Apr 11, 2022 12:14 PM Alley Cheek discharge to home/self care  Follow-up Information     Follow up With Specialties Details Why [address] Call in 3 days If symptoms worsen, As needed [address]            Patient's Medications   Discharge Prescriptions    PREDNISONE 20 MG TABLET    Take 1 tablet (20 mg total) by mouth 2 (two) times a day with meals       Start Date: 4/11/2022 End Date: --       Order Dose: 20 mg       Quantity: 10 tablet    Refills: 0       No discharge procedures on file      PDMP Review       Value Time User    PDMP Reviewed  Yes 2/13/2021 10:21 AM Corrina Tran DO ED Provider  Electronically Signed by           Nita Meléndez DO  04/11/22 4071

## 2022-04-19 NOTE — LETTER
March 12, 2020      Patient: Maciej Welch  YOB: 1980   Date of Visit: 3/12/2020      To Whom it May Concern:    Maciej Welch has been seen by the Shaun Segovia and is now under my care  Maciej Welch may return to work on 03/13/2020 with no restrictions  Please contact me if you should have any questions       Sincerely,    Brook Sanchez, PT
March 12, 2020      Patient: Shelley Armstrong  YOB: 1980   Date of Visit: 3/12/2020      To Whom it May Concern:    Shelley Flank has been seen by the Shaun Segovia and is now under my care  Shelley Flank may return to work on 03/13/2020 with no heavy lifting  Please contact me if you should have any questions       Sincerely,    Keith More, PT
[facility]

## 2023-11-15 ENCOUNTER — TELEPHONE (OUTPATIENT)
Age: 43
End: 2023-11-15

## 2023-11-15 NOTE — TELEPHONE ENCOUNTER
Caller: Patient    Doctor/Office: Savanah Leal    Call regarding :  Request appt w/Dr Savanah Leal     Call was transferred to:  Pod

## 2024-01-29 RX ORDER — ALBUTEROL SULFATE 90 UG/1
2 AEROSOL, METERED RESPIRATORY (INHALATION) EVERY 6 HOURS PRN
COMMUNITY
Start: 2023-11-24

## 2024-01-30 ENCOUNTER — APPOINTMENT (OUTPATIENT)
Dept: RADIOLOGY | Facility: MEDICAL CENTER | Age: 44
End: 2024-01-30
Payer: COMMERCIAL

## 2024-01-30 ENCOUNTER — TELEPHONE (OUTPATIENT)
Age: 44
End: 2024-01-30

## 2024-01-30 ENCOUNTER — OFFICE VISIT (OUTPATIENT)
Dept: PODIATRY | Facility: CLINIC | Age: 44
End: 2024-01-30

## 2024-01-30 VITALS
HEART RATE: 97 BPM | DIASTOLIC BLOOD PRESSURE: 85 MMHG | BODY MASS INDEX: 41.59 KG/M2 | HEIGHT: 73 IN | OXYGEN SATURATION: 97 % | WEIGHT: 313.8 LBS | SYSTOLIC BLOOD PRESSURE: 128 MMHG

## 2024-01-30 DIAGNOSIS — M79.671 RIGHT FOOT PAIN: Primary | ICD-10-CM

## 2024-01-30 DIAGNOSIS — M79.2 NEURITIS: ICD-10-CM

## 2024-01-30 DIAGNOSIS — M79.671 RIGHT FOOT PAIN: ICD-10-CM

## 2024-01-30 DIAGNOSIS — Z87.39 STATUS POST HAMMER TOE CORRECTION: ICD-10-CM

## 2024-01-30 DIAGNOSIS — Z98.890 STATUS POST HAMMER TOE CORRECTION: ICD-10-CM

## 2024-01-30 DIAGNOSIS — M20.41 HAMMER TOE OF RIGHT FOOT: ICD-10-CM

## 2024-01-30 PROCEDURE — 99204 OFFICE O/P NEW MOD 45 MIN: CPT | Performed by: STUDENT IN AN ORGANIZED HEALTH CARE EDUCATION/TRAINING PROGRAM

## 2024-01-30 PROCEDURE — 73630 X-RAY EXAM OF FOOT: CPT

## 2024-01-30 RX ORDER — FLUTICASONE PROPIONATE AND SALMETEROL 250; 50 UG/1; UG/1
1 POWDER RESPIRATORY (INHALATION)
COMMUNITY
Start: 2023-11-24

## 2024-01-30 RX ORDER — GABAPENTIN 100 MG/1
100 CAPSULE ORAL
Qty: 30 CAPSULE | Refills: 1 | Status: SHIPPED | OUTPATIENT
Start: 2024-01-30

## 2024-01-30 RX ORDER — GABAPENTIN 100 MG/1
100 CAPSULE ORAL
Qty: 30 CAPSULE | Refills: 1 | Status: SHIPPED | OUTPATIENT
Start: 2024-01-30 | End: 2024-01-30 | Stop reason: SDUPTHER

## 2024-01-30 NOTE — PROGRESS NOTES
Assessment/Plan:     Diagnoses and all orders for this visit:    Right foot pain  -     X-ray foot right 3+ views; Future  -     Ambulatory Referral to Podiatry; Future  -     gabapentin (Neurontin) 100 mg capsule; Take 1 capsule (100 mg total) by mouth daily at bedtime    Neuritis  -     Ambulatory Referral to Podiatry; Future  -     gabapentin (Neurontin) 100 mg capsule; Take 1 capsule (100 mg total) by mouth daily at bedtime    Hammer toe of right foot    Status post hammer toe correction    Other orders  -     albuterol (PROVENTIL HFA,VENTOLIN HFA) 90 mcg/act inhaler; Inhale 2 puffs every 6 (six) hours as needed  -     Fluticasone-Salmeterol (Advair) 250-50 mcg/dose inhaler; Inhale 1 puff          Imaging Reviewed at this visit (I personally reviewed/independently interpreted images and reports in PACS)  XR right foot WB 3v 1/30/24: No acute osseous abnormalities noted. Hardware intact and without issue toes 2-4 w/ fusion PIPJs. Distal DIPJ contractures 2-4. Adductovarus 5th. Shortening and elevation of 1st metatarsal      IMPRESSION:  Right foot pain, 2-4 toes (distal tips 2-4, submet 2 head)  S/p right 1st toe modified sands bunionectomy, hammertoe repair 2/3/4 (PIPJ arthrodesis) by Dr. Macias (DOS 9/27/21)     PLAN:  I reviewed clinical exam and radiographic imaging (XR) with patient in detail today. I have discussed with the patient the pathophysiology of this diagnosis and reviewed how the examination correlates with this diagnosis.  I reviewed op note and PT from 2021  I reviewed PCP note from 12/22/23  Patient is s/p right hammertoe 2-4 PIPJ arthrodesis (9/27/21). He is experiencing pain to the tips of toes 2-4 which are plantarflexed at the DIPJ. The 2nd toe is still rigidly plantarflexed at the PIPJ. Hardware to PIPJ fusion sites appear WNL however fusion of the PIPJ and not DIPJ is likely causing pressure to the tips of the toes and thus his current pain. He also has pain submet 2 head which may be  due to shortening and elevation of the 1st metatarsal.   I discussed possible revision of his hammertoe procedures may be best option however this may entail removing existing implants which may be very difficult. I discussed that his DIPJ would likely need to be fused to correct the distal deformity. I would like to have patient see Dr. Caballero for a 2nd surgical opinion as this surgery would be extensive and failure rate high.   I recommend stiff bottomed sneakers/shoes (ex Asics, Vionic, New balance, Banuelos, etc) for daily use and Karena Srinivasan (recovery slides) for in home use. Soft and wide in toe box  I advised pt to obtain OTC orthotics such as Dananberg arch supports to offload 2nd met head. I applied dancers pad in meantime  Gabapentin 100 mg HS ordered for patient; counseled regarding sedative effects of taking this medication.  Counseled not to take medication while driving or operating heavy machinery/using stairs.   F/u with Dr. Caballero for 2nd surgical opinion then either myself or Caballero thereafter    Subjective:      Patient ID: Westley Paul is a 43 y.o. male.    Westley presents to clinic today concerning right foot pain. Pain present for quite some time. Notes he had pain to the tops of his right toes 2-4 prior to surgery and after having hammer toe repair in 2021 the pain has worsened and now more to the tips of his toes. + tingling and burning the more he is on his feet. Cannot wear tight shoes but notes pain in all shoes.         The following portions of the patient's history were reviewed and updated as appropriate: allergies, current medications, past family history, past medical history, past social history, past surgical history, and problem list.    Review of Systems   Constitutional:  Negative for activity change, chills and fever.   HENT: Negative.     Respiratory:  Negative for cough, chest tightness and shortness of breath.    Cardiovascular:  Negative for chest pain and leg swelling.  "  Endocrine: Negative.    Genitourinary: Negative.    Musculoskeletal:  Positive for gait problem (R foot pain).   Neurological:  Negative for numbness.   Psychiatric/Behavioral: Negative.  Negative for agitation and behavioral problems.          Objective:      /85   Pulse 97   Ht 6' 1\" (1.854 m)   Wt (!) 142 kg (313 lb 12.8 oz)   SpO2 97%   BMI 41.40 kg/m²          Physical Exam  Constitutional:       General: He is not in acute distress.     Appearance: Normal appearance. He is not ill-appearing.   Cardiovascular:      Pulses: Normal pulses.      Comments: Bilateral DP & PT pulses 2/4. CRT WNL. Pedal hair present. Feet warm and well perfused.   Pulmonary:      Effort: No respiratory distress.   Musculoskeletal:         General: Tenderness (Right toes 2-4 at distal pulp, submet head 2) and deformity (right 2-4 rigid PIPJ fusion with residual 2nd PIPJ hammering. DIPJ plantarflexed contracture right 2-4 toes. Prominent 2nd metatarsal head with thin callusing) present.      Comments: Bilateral ankle, STJ, TNJ, TMTJ, MTPJ ROM WNL and without pain. LE muscle strength WNL.   Skin:     General: Skin is warm.      Capillary Refill: Capillary refill takes less than 2 seconds.      Findings: No erythema or lesion.   Neurological:      General: No focal deficit present.      Mental Status: He is alert and oriented to person, place, and time.      Sensory: No sensory deficit.      Comments: Gross sensation intact. Denies N/T/B to B/L feet.    Psychiatric:         Mood and Affect: Mood normal.         Behavior: Behavior normal.           "

## 2024-01-30 NOTE — TELEPHONE ENCOUNTER
Caller: Westley     Doctor/Office: Kathie/Wally     #: [phone]    Escalation: Medication Sent rx to [facility] in [place] should go to  [facility] in [place]

## 2024-01-30 NOTE — PATIENT INSTRUCTIONS
Foot Pain Home Therapy    Wear supportive shoes at all times. Avoid flip-flops, flat sandals, barefoot walking (never walk barefoot, even at home). Generally avoid shoes that are too flexible and bend in the arch. Your shoes should only slightly bend in the toe area, not the middle. Running sneakers are often the best choice.  Supportive sneaker brands: Banuelos, On Cloud, Hoka, Altra, New Balance, Asics, Mizuno  [place] [employer] (discount if mention Dr referred)  Fleet Feet [place]  [facility]   Slovan Altia [place]  Supportive daily shoe brands: Vionic, Orthofeet, Holly, Dansko, Chacos, Bruner, Teva, Birkenstock  Supportive home shoes: Karena Srinivasan (recovery slides)  Purchase over the counter topical pain creams such as Voltarin gel, biofreeze, or CBD cream - will need to apply 2-3 times per day for benefit. + deep tissue massage.   Look in to over the counter shoe inserts/arch supports such as Dananberg arch supports. These are all available on [employer].com as well as their individual website's.   Futubra: Vasyli+Dananberg 1st Ray Orthotic, Medium, 1st Ray Function, Medium Density, Full-Length Insole, Heat Molding Optional, Best All Around Orthotic, Functional Biomechanical Control for Pain Relief, Black Yellow ([id number]) : Health & Household

## 2024-01-31 ENCOUNTER — TELEPHONE (OUTPATIENT)
Age: 44
End: 2024-01-31

## 2024-01-31 NOTE — TELEPHONE ENCOUNTER
Caller: Westley Paul    Doctor: Kathie Lopez    Reason for call: Westley does not want to take the prescribed gabapentin (Neurontin) 100 mg capsule .  He read the pamphlet that came with it and he does not want to take the medicine because of the possible side effects.  Can someone reach out to him?  Thank you.    Call back#: [phone]

## 2024-02-09 ENCOUNTER — TELEPHONE (OUTPATIENT)
Age: 44
End: 2024-02-09

## 2024-02-09 NOTE — TELEPHONE ENCOUNTER
Caller: Westley     Doctor/Office: Federico/Daja    #: [phone]    Escalation: Appointment Patient is sched to see Dr Caballero on 2/15 in [place]. He is having car issues and is wondering if there is anyway you can get him the [place] office he lives much closer and it would be easier to get a ride there.  Ashwin advise thank you

## 2024-02-13 ENCOUNTER — TELEPHONE (OUTPATIENT)
Dept: PODIATRY | Facility: CLINIC | Age: 44
End: 2024-02-13

## 2024-02-13 NOTE — TELEPHONE ENCOUNTER
Caller: Westley Paul    Doctor/Office: Doc Caballero DPM    #: [phone]    Escalation: Westley said that he called yesterday and spoke with someone.  I went into his chart to read the message.

## 2024-04-04 ENCOUNTER — OFFICE VISIT (OUTPATIENT)
Dept: PODIATRY | Facility: CLINIC | Age: 44
End: 2024-04-04
Payer: COMMERCIAL

## 2024-04-04 ENCOUNTER — TELEPHONE (OUTPATIENT)
Dept: PODIATRY | Facility: CLINIC | Age: 44
End: 2024-04-04

## 2024-04-04 VITALS
DIASTOLIC BLOOD PRESSURE: 89 MMHG | SYSTOLIC BLOOD PRESSURE: 128 MMHG | WEIGHT: 313 LBS | HEART RATE: 87 BPM | BODY MASS INDEX: 41.3 KG/M2

## 2024-04-04 DIAGNOSIS — M20.41 HAMMER TOE OF RIGHT FOOT: Primary | ICD-10-CM

## 2024-04-04 DIAGNOSIS — M79.671 RIGHT FOOT PAIN: ICD-10-CM

## 2024-04-04 DIAGNOSIS — M79.2 NEURITIS: ICD-10-CM

## 2024-04-04 PROCEDURE — 99213 OFFICE O/P EST LOW 20 MIN: CPT | Performed by: PODIATRIST

## 2024-04-04 RX ORDER — BUDESONIDE AND FORMOTEROL FUMARATE DIHYDRATE 160; 4.5 UG/1; UG/1
2 AEROSOL RESPIRATORY (INHALATION) 2 TIMES DAILY
COMMUNITY
Start: 2024-03-22

## 2024-04-04 NOTE — PROGRESS NOTES
Assessment/Plan:    No problem-specific Assessment & Plan notes found for this encounter.       Diagnoses and all orders for this visit:    Hammer toe of right foot    Right foot pain  -     Ambulatory Referral to Podiatry    Neuritis  -     Ambulatory Referral to Podiatry    Other orders  -     Cymbalta 60 MG delayed release capsule; Take 60 mg by mouth daily  -     Symbicort 160-4.5 MCG/ACT inhaler; Inhale 2 puffs 2 (two) times a day      -X-rays 3 views weightbearing taken reviewed the right foot and do show intact Smart toe implants to 3 and 4 right foot, normal position of the PIPJ on AP view there is sclerosis at the second toe, on lateral view the PIPJ of the second toe is very easily visualized and arranged, approximate 20 to 30 degrees and plantarflexion but fused the third and fourth toes are adductovarus in nature  - He is having pain and disability inability to bear weight after 2 to 3 hours due to the toes, they are causing issues with him in shoes and ambulation.  He reports tip of the toe pain pressure and also pain at the MTPJ.  - Options for management are limited, he now has a new onset separate flexible contracture of the DIPJ, we did discuss possible flexor tenotomy of the digits to hopefully alleviate some of the retrograde pressure and the current contracture at the DIPJ, if this does not work we will then have to progress to implant removal and surgical revision of the digits in the position which would likely have a very variable outcome, we even did discuss amputation of the most affected digit, the second toe  - She is to return in 4 to 5 weeks for flexor tenotomy of the most severely affected digit, if this is somewhat effective and he is happy, we will consider further flexor tenotomy's of 3 and 4 in the future  - Dr. Vázquez's note was reviewed    Subjective:      Patient ID: Westley Paul is a 43 y.o. male.    Patient transfer evaluation management of his right foot, he did have  surgery approximately 3 years ago by Dr. Colleen read in [place].  He notes continued pain, deformity and issues with ambulation and working.  He is currently not working due to pain in his foot he has difficulty being in a normal shoe and being on his foot for more than 2 hours.  He reports that for the toe pain which also leads to joint pain at the the area behind the toes        The following portions of the patient's history were reviewed and updated as appropriate: allergies, current medications, past family history, past medical history, past social history, past surgical history, and problem list.    Review of Systems   Constitutional:  Negative for chills and fever.   HENT:  Negative for ear pain and sore throat.    Eyes:  Negative for pain and visual disturbance.   Respiratory:  Negative for cough and shortness of breath.    Cardiovascular:  Negative for chest pain and palpitations.   Gastrointestinal:  Negative for abdominal pain and vomiting.   Genitourinary:  Negative for dysuria and hematuria.   Musculoskeletal:  Negative for arthralgias and back pain.   Skin:  Negative for color change and rash.   Neurological:  Negative for seizures and syncope.   All other systems reviewed and are negative.        Objective:      /89 (BP Location: Right arm, Patient Position: Sitting, Cuff Size: Large)   Pulse 87   Wt (!) 142 kg (313 lb)   BMI 41.30 kg/m²          Physical Exam  Musculoskeletal:      Comments: His toes remain severely deformed, the surgical results with her worst from 2, 3 and 4.  4 is slightly acceptable but the second digit is rather unacceptable with around 30 to 40 degrees of plantarflexion and it is rigid.  Upon ambulation the digits 2 3 and 4 are nonpracticing, they are hooked with now DIPJ contractures and retrograde pressure and jamming at the MTPJ

## 2025-02-18 ENCOUNTER — TELEPHONE (OUTPATIENT)
Age: 45
End: 2025-02-18

## 2025-02-18 NOTE — TELEPHONE ENCOUNTER
Please see below message. Patient calling in again for help in getting his office notes. He would like a call back. Thank you

## 2025-02-18 NOTE — TELEPHONE ENCOUNTER
Caller: Westley     Doctor and/or Office: Dr. Caballero/Kathie     #: [phone]    Escalation: Care Patient asked if you can please print out his chart notes for Dr Caballero and Dr Vzáquez and please call him when it is ready he will come on and pick it up.  Thank you

## 2025-02-18 NOTE — TELEPHONE ENCOUNTER
Called patient- printed two visit summaries and left at  in [facility] for pickup. Patient plans to come in for them before noon.

## 2025-03-24 NOTE — ED PROVIDER NOTES
History  Chief Complaint   Patient presents with    Overdose - Accidental     Heroin     21-year-old gentleman presents status post accidental overdose of heroin  He reports that he had been clean for a number of months and his wife found him unresponsive with sonorous respirations he admits to snorting one bag of heroin  He was given 2 mg of Narcan IV with of his status  He was noted to be hypoxic in the mid 80s room air  Denies any chest pain, shortness of breath, headaches, dizziness, or other acute issues or concerns  He admits to smoking marijuana in addition to starting the heroin  Overdose - Accidental   Ingested substance:  Illicit drugs  Suspected agents: Heroin  Context: recreational    Associated symptoms: altered mental status and unresponsiveness    Associated symptoms: no abdominal pain, no chest pain, no diarrhea, no nausea and no vomiting        Prior to Admission Medications   Prescriptions Last Dose Informant Patient Reported? Taking? methocarbamol (ROBAXIN) 500 mg tablet   Yes No   Sig: Take 1,000 mg by mouth 4 (four) times a day  oxyCODONE-acetaminophen (PERCOCET) 5-325 mg per tablet   No No   Si-2  po q 6 hrs prn pain  Ongoing therapy      Facility-Administered Medications: None       Past Medical History:   Diagnosis Date    Asthma        History reviewed  No pertinent surgical history  History reviewed  No pertinent family history  I have reviewed and agree with the history as documented  Social History     Tobacco Use    Smoking status: Current Every Day Smoker   Substance Use Topics    Alcohol use: Not on file    Drug use: Not on file        Review of Systems   Constitutional: Negative for activity change, chills, fatigue and fever  HENT: Negative  Negative for congestion, postnasal drip, rhinorrhea, sinus pain, sore throat and trouble swallowing  Eyes: Negative  Respiratory: Negative  Cardiovascular: Negative for chest pain     Gastrointestinal: Negative for abdominal pain, constipation, diarrhea, nausea and vomiting  Endocrine: Negative  Genitourinary: Negative  Musculoskeletal: Negative  Negative for arthralgias, back pain and myalgias  Skin: Negative  Allergic/Immunologic: Negative  Neurological: Negative  Hematological: Negative  Physical Exam  Physical Exam   Constitutional: He is oriented to person, place, and time  He appears well-developed and well-nourished  No distress  HENT:   Head: Normocephalic and atraumatic  Eyes: Pupils are equal, round, and reactive to light  Neck: Neck supple  Cardiovascular: Normal rate, regular rhythm and normal heart sounds  Pulmonary/Chest: Effort normal  No respiratory distress  He has decreased breath sounds in the right lower field and the left lower field  Abdominal: Soft  Bowel sounds are normal  He exhibits no distension  There is no tenderness  There is no guarding  Musculoskeletal: Normal range of motion  He exhibits no edema  Neurological: He is alert and oriented to person, place, and time  Skin: Skin is warm and dry  Capillary refill takes less than 2 seconds  He is not diaphoretic  Psychiatric: He has a normal mood and affect  His behavior is normal    Nursing note and vitals reviewed        Vital Signs  ED Triage Vitals [08/03/19 0652]   Temperature Pulse Respirations Blood Pressure SpO2   (!) 96 8 °F (36 °C) (!) 106 16 113/68 90 %      Temp Source Heart Rate Source Patient Position - Orthostatic VS BP Location FiO2 (%)   Tympanic Monitor Lying Left arm --      Pain Score       --           Vitals:    08/03/19 0652   BP: 113/68   Pulse: (!) 106   Patient Position - Orthostatic VS: Lying         Visual Acuity      ED Medications  Medications   naloxone (NARCAN) 2 MG/2ML injection **ADS Override Pull** (has no administration in time range)       Diagnostic Studies  Results Reviewed     None                 XR chest 1 view portable    (Results Pending) Procedures  Procedures       ED Course                               MDM    Disposition  Final diagnoses:   Heroin overdose ([facility] [address] )   Drug abuse ([facility] )     Time reflects when diagnosis was documented in both MDM as applicable and the Disposition within this note     Time User Action Codes Description Comment    8/3/2019  6:58 AM Celia Evaristo Add [T40 1X1A] Heroin overdose ([facility] )     8/3/2019  6:58 AM Celia Evaristo Add [F19 10] Drug abuse [facility])       ED Disposition     None      Follow-up Information    None         Patient's Medications   Discharge Prescriptions    No medications on file     No discharge procedures on file      ED Provider  Electronically Signed by           Shirley Simons DO  08/03/19 5307 [0 - No Distress] : Distress Level: 0 [ECOG Performance Status: 1 - Restricted in physically strenuous activity but ambulatory and able to carry out work of a light or sedentary nature] : Performance Status: 1 - Restricted in physically strenuous activity but ambulatory and able to carry out work of a light or sedentary nature, e.g., light house work, office work [de-identified] : Mr. Kenyon Hemphill is a 77 year old male referred to office by Dr. Connell for initial consultation for colon mass.  Pt is a resident of [facility] and was in [facility] from 09/21/24-09/24/24 d/t acute on chronic anemia. Stool guaiac and rectal exam per ED positive for rachele blood, no masses. Patient underwent 2 units of PRBC on 9/21 with improvement in the h/h. Found to have PVT. GI was consulted and patient underwent endoscopy/colonoscopy on 9/24 a 4 cm mass was found at the proximal ascending colon. H/O rec ppx AC for PVT start Lovenox 40 mg SC daily.  Colonoscopy/Endoscopy performed on 09/24/24: RESULTS OF MISMATCH REPAIR (MMR) TESTING BY IMMUNOHISTOCHEMISTRY: MLH-1              Intact nuclear expression MSH-2              Intact nuclear expression MSH-6              Intact nuclear expression PMS-2              Intact nuclear expression Background non-neoplastic tissue/internal control shows intact nuclear expression. Interpretation:  Mismatch Repair Protein Panel Normal.  Low probability of MSI-H. Results indicate a low probability of Alexander syndrome.  However if clinical suspicion for Alexander syndrome is high, referral to genetic counseling should be considered.  FINAL DIAGNOSIS  ASCENDING COLON MASS, BIOPSIES: - Invasive adenocarcinoma with surface ulceration. - See comment  DIAGNOSIS COMMENT MMR Immunohistochemical stains have been ordered and result will be reported as an addendum. There has been intradepartmental review of the case with agreement of the above findings. Biopsy result has been sent via e-mail to Dr. Arreguin on 9/26/24.  K63.5 Pre-op Diagnosis: Colon mass Post-op Diagnosis: Colon mass  SPECIMEN(S) RECEIVED  A. Specimen - ASCENDING COLON MASS BIOPSY Received in formalin labeled "ascending colon mass biopsy" are multiple, tan-pink, soft tissue fragments ranging from 0.2 cm to 0.4 cm in greatest dimension. Entirely in one cassette: A1.  Past med hx: dementia, DM2, BPH, hx recurrent UTI, atherosclerotic heart disease, mixed HLD, MDD, anxiety, protein calorie malnutrition, onychomycosis   Past sug hx: none    Related family history: unknown Occupation:    Smoking: Former Smoker: stopped 5 years ago ETOH: none Illicit drug use: none    Health Maintenance: Endoscopy: Sept 2024 Prostate exam: unknown   [de-identified] : 3/19/25- here with transport aide from [facility] No new complaints, other than being hungry.   New mildly erythematous rash noted to mid-face.  No breaks in skin integrity, warmth, exudate.  No reports of itchiness or pain at rash site.    No reports of pain. Continues to be confused.    no neuropathy, desquamation, or cold sensitivity reported.  No s/s bleeding.

## (undated) DEVICE — GAUZE SPONGES,16 PLY: Brand: CURITY

## (undated) DEVICE — STANDARD DRILL BIT , AO

## (undated) DEVICE — NEEDLE 25G X 1 1/2

## (undated) DEVICE — BETHLEHEM UNIVERSAL  MIONR EXT: Brand: CARDINAL HEALTH

## (undated) DEVICE — SYRINGE 10ML LL

## (undated) DEVICE — 10FR FRAZIER SUCTION HANDLE: Brand: CARDINAL HEALTH

## (undated) DEVICE — NEEDLE 18 G X 1 1/2

## (undated) DEVICE — MASTISOL LIQ ADHESIVE 2/3ML

## (undated) DEVICE — SUPER SPONGES,MEDIUM: Brand: KERLIX

## (undated) DEVICE — PLUMEPEN PRO 10FT

## (undated) DEVICE — STOP PIN: Brand: SMART TOE

## (undated) DEVICE — CAST PADDING 4 IN SYNTHETIC NON-STRL

## (undated) DEVICE — INTENDED FOR TISSUE SEPARATION, AND OTHER PROCEDURES THAT REQUIRE A SHARP SURGICAL BLADE TO PUNCTURE OR CUT.: Brand: BARD-PARKER ® CARBON RIB-BACK BLADES

## (undated) DEVICE — STRETCH BANDAGE: Brand: CURITY

## (undated) DEVICE — SUT VICRYL 3-0 PS-2 27 IN J427H

## (undated) DEVICE — SUT ETHILON 3-0 PS-1 18 IN 1663G

## (undated) DEVICE — 2000CC GUARDIAN II: Brand: GUARDIAN

## (undated) DEVICE — SCD SEQUENTIAL COMPRESSION COMFORT SLEEVE MEDIUM KNEE LENGTH: Brand: KENDALL SCD

## (undated) DEVICE — GLOVE SRG BIOGEL 7.5

## (undated) DEVICE — CHLORAPREP HI-LITE 26ML ORANGE

## (undated) DEVICE — OCCLUSIVE GAUZE STRIP,3% BISMUTH TRIBROMOPHENATE IN PETROLATUM BLEND: Brand: XEROFORM

## (undated) DEVICE — 3M™ STERI-STRIP™ REINFORCED ADHESIVE SKIN CLOSURES, R1546, 1/4 IN X 4 IN (6 MM X 100 MM), 10 STRIPS/ENVELOPE: Brand: 3M™ STERI-STRIP™

## (undated) DEVICE — Device

## (undated) DEVICE — STOCKINETTE REGULAR

## (undated) DEVICE — ACE WRAP 4 IN UNSTERILE